# Patient Record
Sex: MALE | Race: WHITE | NOT HISPANIC OR LATINO
[De-identification: names, ages, dates, MRNs, and addresses within clinical notes are randomized per-mention and may not be internally consistent; named-entity substitution may affect disease eponyms.]

---

## 2017-02-17 ENCOUNTER — PRIOR ORIGINAL RECORDS (OUTPATIENT)
Dept: OTHER | Age: 57
End: 2017-02-17

## 2017-02-17 ENCOUNTER — APPOINTMENT (OUTPATIENT)
Dept: GENERAL RADIOLOGY | Facility: HOSPITAL | Age: 57
End: 2017-02-17
Payer: COMMERCIAL

## 2017-02-17 ENCOUNTER — APPOINTMENT (OUTPATIENT)
Dept: CT IMAGING | Facility: HOSPITAL | Age: 57
End: 2017-02-17
Attending: EMERGENCY MEDICINE
Payer: COMMERCIAL

## 2017-02-17 ENCOUNTER — HOSPITAL ENCOUNTER (EMERGENCY)
Facility: HOSPITAL | Age: 57
Discharge: HOME | End: 2017-02-17
Payer: COMMERCIAL

## 2017-02-17 VITALS
DIASTOLIC BLOOD PRESSURE: 70 MMHG | OXYGEN SATURATION: 97 % | TEMPERATURE: 97 F | SYSTOLIC BLOOD PRESSURE: 113 MMHG | WEIGHT: 222 LBS | RESPIRATION RATE: 16 BRPM | HEART RATE: 80 BPM | HEIGHT: 70 IN | BODY MASS INDEX: 31.78 KG/M2

## 2017-02-17 DIAGNOSIS — R05.9 COUGH: Primary | ICD-10-CM

## 2017-02-17 LAB
ALBUMIN SERPL-MCNC: 3.5 G/DL (ref 3.5–4.8)
ALP LIVER SERPL-CCNC: 89 U/L (ref 45–117)
ALT SERPL-CCNC: 44 U/L (ref 17–63)
AST SERPL-CCNC: 29 U/L (ref 15–41)
ATRIAL RATE: 81 BPM
BASOPHILS # BLD AUTO: 0.03 X10(3) UL (ref 0–0.1)
BASOPHILS NFR BLD AUTO: 0.5 %
BILIRUB SERPL-MCNC: 0.6 MG/DL (ref 0.1–2)
BUN BLD-MCNC: 16 MG/DL (ref 8–20)
CALCIUM BLD-MCNC: 8.8 MG/DL (ref 8.3–10.3)
CHLORIDE: 105 MMOL/L (ref 101–111)
CO2: 24 MMOL/L (ref 22–32)
CREAT BLD-MCNC: 1.12 MG/DL (ref 0.7–1.3)
EOSINOPHIL # BLD AUTO: 0.01 X10(3) UL (ref 0–0.3)
EOSINOPHIL NFR BLD AUTO: 0.2 %
ERYTHROCYTE [DISTWIDTH] IN BLOOD BY AUTOMATED COUNT: 13.1 % (ref 11.5–16)
GLUCOSE BLD-MCNC: 105 MG/DL (ref 70–99)
HCT VFR BLD AUTO: 45.8 % (ref 37–53)
HGB BLD-MCNC: 15.5 G/DL (ref 13–17)
IMMATURE GRANULOCYTE COUNT: 0.03 X10(3) UL (ref 0–1)
IMMATURE GRANULOCYTE RATIO %: 0.5 %
LYMPHOCYTES # BLD AUTO: 0.7 X10(3) UL (ref 0.9–4)
LYMPHOCYTES NFR BLD AUTO: 11.3 %
M PROTEIN MFR SERPL ELPH: 7.2 G/DL (ref 6.1–8.3)
MCH RBC QN AUTO: 29.4 PG (ref 27–33.2)
MCHC RBC AUTO-ENTMCNC: 33.8 G/DL (ref 31–37)
MCV RBC AUTO: 86.7 FL (ref 80–99)
MONOCYTES # BLD AUTO: 0.54 X10(3) UL (ref 0.1–0.6)
MONOCYTES NFR BLD AUTO: 8.8 %
NEUTROPHIL ABS PRELIM: 4.86 X10 (3) UL (ref 1.3–6.7)
NEUTROPHILS # BLD AUTO: 4.86 X10(3) UL (ref 1.3–6.7)
NEUTROPHILS NFR BLD AUTO: 78.7 %
P AXIS: 32 DEGREES
P-R INTERVAL: 136 MS
PLATELET # BLD AUTO: 143 10(3)UL (ref 150–450)
POTASSIUM SERPL-SCNC: 3.7 MMOL/L (ref 3.6–5.1)
Q-T INTERVAL: 394 MS
QRS DURATION: 100 MS
QTC CALCULATION (BEZET): 457 MS
R AXIS: -5 DEGREES
RBC # BLD AUTO: 5.28 X10(6)UL (ref 4.3–5.7)
RED CELL DISTRIBUTION WIDTH-SD: 41.2 FL (ref 35.1–46.3)
SODIUM SERPL-SCNC: 138 MMOL/L (ref 136–144)
T AXIS: 49 DEGREES
VENTRICULAR RATE: 81 BPM
WBC # BLD AUTO: 6.2 X10(3) UL (ref 4–13)

## 2017-02-17 PROCEDURE — 71275 CT ANGIOGRAPHY CHEST: CPT

## 2017-02-17 PROCEDURE — 36415 COLL VENOUS BLD VENIPUNCTURE: CPT

## 2017-02-17 PROCEDURE — 71020 XR CHEST PA + LAT CHEST (CPT=71020): CPT

## 2017-02-17 PROCEDURE — 80053 COMPREHEN METABOLIC PANEL: CPT | Performed by: EMERGENCY MEDICINE

## 2017-02-17 PROCEDURE — 93010 ELECTROCARDIOGRAM REPORT: CPT

## 2017-02-17 PROCEDURE — 93005 ELECTROCARDIOGRAM TRACING: CPT

## 2017-02-17 PROCEDURE — 99285 EMERGENCY DEPT VISIT HI MDM: CPT

## 2017-02-17 PROCEDURE — 85025 COMPLETE CBC W/AUTO DIFF WBC: CPT | Performed by: EMERGENCY MEDICINE

## 2017-02-17 RX ORDER — BENZONATATE 100 MG/1
100 CAPSULE ORAL 3 TIMES DAILY PRN
COMMUNITY
End: 2019-12-22 | Stop reason: CLARIF

## 2017-02-17 RX ORDER — PRAVASTATIN SODIUM 40 MG
40 TABLET ORAL DAILY
COMMUNITY

## 2017-02-17 RX ORDER — PREDNISONE 10 MG/1
10 TABLET ORAL DAILY
COMMUNITY
End: 2019-12-22 | Stop reason: CLARIF

## 2017-02-17 NOTE — ED PROVIDER NOTES
Patient Seen in: [facility] Emergency Department    History   Patient presents with:  Fever Sepsis (infectious)  Cough/URI    Stated Complaint: fever, JUAN PABLO    HPI    65-year-old white male who presents emerged from today for complaint of cough.   [place] PROMETHAZINE-DM OR,  Take by mouth.   predniSONE 10 MG Oral Tab,  Take 10 mg by mouth daily. UNKNOWN TO PATIENT,  cholesterol   Esomeprazole Magnesium (NEXIUM OR),  Take  by mouth. Fexofenadine HCl (ALLEGRA ALLERGY OR),  Take  by mouth daily.    PARoxet nontender to deep palpation there is no rebound or guarding noted no hepatosplenomegaly is noted. No masses are noted. No hernias are palpated. Extremity exam reveals no clubbing cyanosis or edema.   Patient has good radial pulses noted bilaterally in identified. There is no pneumothorax. Heart size and vascularity are normal.\    Impression:      CONCLUSION:  Mediastinal or right hilar mass is suspected.  CT for further evaluation is recommended.        CT scan of the chest revealed:    FINDINGS:    VAS Impression:  Cough  (primary encounter diagnosis)    Disposition:  Discharge    Follow-up:  Maverick Hand, [address] [address]  [phone]    In 2 days        Medications Prescribed:  Current Discharge Medication List

## 2017-03-06 ENCOUNTER — PRIOR ORIGINAL RECORDS (OUTPATIENT)
Dept: OTHER | Age: 57
End: 2017-03-06

## 2017-03-06 ENCOUNTER — MYAURORA ACCOUNT LINK (OUTPATIENT)
Dept: OTHER | Age: 57
End: 2017-03-06

## 2017-03-08 LAB
ALBUMIN: 3.5 G/DL
ALKALINE PHOSPHATATE(ALK PHOS): 89 IU/L
BILIRUBIN TOTAL: 0.6 MG/DL
BUN: 16 MG/DL
CALCIUM: 8.8 MG/DL
CHLORIDE: 105 MEQ/L
CREATININE, SERUM: 1.12 MG/DL
GLUCOSE: 105 MG/DL
HEMATOCRIT: 45.8 %
HEMOGLOBIN: 15.5 G/DL
PLATELETS: 143 K/UL
POTASSIUM, SERUM: 3.7 MEQ/L
PROTEIN, TOTAL: 7.2 G/DL
RED BLOOD COUNT: 5.28 X 10-6/U
SGOT (AST): 29 IU/L
SGPT (ALT): 44 IU/L
SODIUM: 138 MEQ/L
WHITE BLOOD COUNT: 6.2 X 10-3/U

## 2018-02-27 ENCOUNTER — HOSPITAL ENCOUNTER (OUTPATIENT)
Dept: CV DIAGNOSTICS | Facility: HOSPITAL | Age: 58
Discharge: HOME | End: 2018-02-27
Attending: INTERNAL MEDICINE

## 2018-02-27 ENCOUNTER — MYAURORA ACCOUNT LINK (OUTPATIENT)
Dept: OTHER | Age: 58
End: 2018-02-27

## 2018-02-27 DIAGNOSIS — Q23.1 BICUSPID AORTIC VALVE: ICD-10-CM

## 2018-02-27 DIAGNOSIS — I35.1 NONRHEUMATIC AORTIC VALVE INSUFFICIENCY: ICD-10-CM

## 2018-03-05 ENCOUNTER — MYAURORA ACCOUNT LINK (OUTPATIENT)
Dept: OTHER | Age: 58
End: 2018-03-05

## 2018-03-05 ENCOUNTER — PRIOR ORIGINAL RECORDS (OUTPATIENT)
Dept: OTHER | Age: 58
End: 2018-03-05

## 2018-03-17 ENCOUNTER — HOSPITAL ENCOUNTER (OUTPATIENT)
Dept: CV DIAGNOSTICS | Facility: HOSPITAL | Age: 58
Discharge: HOME | End: 2018-03-17
Attending: INTERNAL MEDICINE
Payer: COMMERCIAL

## 2018-03-17 DIAGNOSIS — I10 HTN (HYPERTENSION): ICD-10-CM

## 2018-03-17 DIAGNOSIS — I35.1 AORTIC VALVE INSUFFICIENCY, ACQUIRED: ICD-10-CM

## 2018-03-17 PROCEDURE — 93018 CV STRESS TEST I&R ONLY: CPT | Performed by: INTERNAL MEDICINE

## 2018-03-17 PROCEDURE — 93017 CV STRESS TEST TRACING ONLY: CPT | Performed by: INTERNAL MEDICINE

## 2018-03-17 PROCEDURE — 78452 HT MUSCLE IMAGE SPECT MULT: CPT | Performed by: INTERNAL MEDICINE

## 2018-09-09 ENCOUNTER — OFFICE VISIT (OUTPATIENT)
Dept: FAMILY MEDICINE CLINIC | Facility: CLINIC | Age: 58
End: 2018-09-09
Payer: COMMERCIAL

## 2018-09-09 VITALS
OXYGEN SATURATION: 98 % | DIASTOLIC BLOOD PRESSURE: 78 MMHG | RESPIRATION RATE: 18 BRPM | TEMPERATURE: 99 F | HEART RATE: 106 BPM | SYSTOLIC BLOOD PRESSURE: 144 MMHG

## 2018-09-09 DIAGNOSIS — J01.10 ACUTE NON-RECURRENT FRONTAL SINUSITIS: Primary | ICD-10-CM

## 2018-09-09 PROCEDURE — 99203 OFFICE O/P NEW LOW 30 MIN: CPT | Performed by: NURSE PRACTITIONER

## 2018-09-09 RX ORDER — AMOXICILLIN AND CLAVULANATE POTASSIUM 875; 125 MG/1; MG/1
1 TABLET, FILM COATED ORAL 2 TIMES DAILY
Qty: 20 TABLET | Refills: 0 | Status: SHIPPED | OUTPATIENT
Start: 2018-09-09 | End: 2018-09-19

## 2018-09-09 NOTE — PROGRESS NOTES
CHIEF COMPLAINT:   Patient presents with:  Sore Throat  Nasal Congestion      HPI:   Brionna Valladares is a 60year old male who presents for sinus congestion for  10  days. Symptoms have been worsening since onset.  Sinus congestion/pain is described as a essential hypertension       Past Surgical History:  2000: COLONOSCOPY  No date: SINUS SURGERY    11/2011: UPPER GI ENDOSCOPY - REFERRAL      Comment:  THE [facility] 11/16/11.  Nl esophagus, small hiatal hernia,                otherwise normal.   Family History murmur  EXTREMITIES: no cyanosis, clubbing or edema  LYMPH:  No lymphadenopathy. ASSESSMENT AND PLAN:   ASSESSMENT:  Ross Godinez is a 60year old male who presents with    ASSESSMENT: 1. Acute Sinusitis. PLAN: Meds as below.   Comfort care inst

## 2019-01-01 ENCOUNTER — HOSPITAL ENCOUNTER (EMERGENCY)
Facility: HOSPITAL | Age: 59
Discharge: HOME | End: 2019-01-01
Attending: EMERGENCY MEDICINE
Payer: COMMERCIAL

## 2019-01-01 ENCOUNTER — APPOINTMENT (OUTPATIENT)
Dept: GENERAL RADIOLOGY | Facility: HOSPITAL | Age: 59
End: 2019-01-01
Attending: EMERGENCY MEDICINE
Payer: COMMERCIAL

## 2019-01-01 VITALS
OXYGEN SATURATION: 97 % | DIASTOLIC BLOOD PRESSURE: 86 MMHG | WEIGHT: 240 LBS | TEMPERATURE: 98 F | SYSTOLIC BLOOD PRESSURE: 133 MMHG | RESPIRATION RATE: 25 BRPM | BODY MASS INDEX: 33.6 KG/M2 | HEIGHT: 71 IN | HEART RATE: 55 BPM

## 2019-01-01 DIAGNOSIS — R07.89 CHEST PAIN, ATYPICAL: Primary | ICD-10-CM

## 2019-01-01 LAB
ALBUMIN SERPL-MCNC: 3.5 G/DL (ref 3.1–4.5)
ALBUMIN/GLOB SERPL: 0.8 {RATIO} (ref 1–2)
ALP LIVER SERPL-CCNC: 92 U/L (ref 45–117)
ALT SERPL-CCNC: 61 U/L (ref 17–63)
ANION GAP SERPL CALC-SCNC: 5 MMOL/L (ref 0–18)
AST SERPL-CCNC: 52 U/L (ref 15–41)
BASOPHILS # BLD AUTO: 0.05 X10(3) UL (ref 0–0.1)
BASOPHILS NFR BLD AUTO: 0.9 %
BILIRUB SERPL-MCNC: 0.5 MG/DL (ref 0.1–2)
BUN BLD-MCNC: 11 MG/DL (ref 8–20)
BUN/CREAT SERPL: 14.7 (ref 10–20)
CALCIUM BLD-MCNC: 8.9 MG/DL (ref 8.3–10.3)
CHLORIDE SERPL-SCNC: 105 MMOL/L (ref 101–111)
CO2 SERPL-SCNC: 25 MMOL/L (ref 22–32)
CREAT BLD-MCNC: 0.75 MG/DL (ref 0.7–1.3)
D-DIMER: 0.4 UG/ML FEU (ref 0–0.49)
EOSINOPHIL # BLD AUTO: 0.17 X10(3) UL (ref 0–0.3)
EOSINOPHIL NFR BLD AUTO: 3 %
ERYTHROCYTE [DISTWIDTH] IN BLOOD BY AUTOMATED COUNT: 12.5 % (ref 11.5–16)
GLOBULIN PLAS-MCNC: 4.2 G/DL (ref 2.8–4.4)
GLUCOSE BLD-MCNC: 96 MG/DL (ref 70–99)
HCT VFR BLD AUTO: 47.2 % (ref 37–53)
HGB BLD-MCNC: 16.4 G/DL (ref 13–17)
IMMATURE GRANULOCYTE COUNT: 0.02 X10(3) UL (ref 0–1)
IMMATURE GRANULOCYTE RATIO %: 0.4 %
LYMPHOCYTES # BLD AUTO: 1.99 X10(3) UL (ref 0.9–4)
LYMPHOCYTES NFR BLD AUTO: 35 %
M PROTEIN MFR SERPL ELPH: 7.7 G/DL (ref 6.4–8.2)
MCH RBC QN AUTO: 30.4 PG (ref 27–33.2)
MCHC RBC AUTO-ENTMCNC: 34.7 G/DL (ref 31–37)
MCV RBC AUTO: 87.6 FL (ref 80–99)
MONOCYTES # BLD AUTO: 0.71 X10(3) UL (ref 0.1–1)
MONOCYTES NFR BLD AUTO: 12.5 %
NEUTROPHIL ABS PRELIM: 2.75 X10 (3) UL (ref 1.3–6.7)
NEUTROPHILS # BLD AUTO: 2.75 X10(3) UL (ref 1.3–6.7)
NEUTROPHILS NFR BLD AUTO: 48.2 %
OSMOLALITY SERPL CALC.SUM OF ELEC: 279 MOSM/KG (ref 275–295)
PLATELET # BLD AUTO: 131 10(3)UL (ref 150–450)
POTASSIUM SERPL-SCNC: 3.5 MMOL/L (ref 3.6–5.1)
RBC # BLD AUTO: 5.39 X10(6)UL (ref 4.3–5.7)
RED CELL DISTRIBUTION WIDTH-SD: 39.8 FL (ref 35.1–46.3)
SODIUM SERPL-SCNC: 135 MMOL/L (ref 136–144)
TROPONIN I SERPL-MCNC: <0.046 NG/ML (ref ?–0.05)
WBC # BLD AUTO: 5.7 X10(3) UL (ref 4–13)

## 2019-01-01 PROCEDURE — 84460 ALANINE AMINO (ALT) (SGPT): CPT | Performed by: EMERGENCY MEDICINE

## 2019-01-01 PROCEDURE — 84132 ASSAY OF SERUM POTASSIUM: CPT | Performed by: EMERGENCY MEDICINE

## 2019-01-01 PROCEDURE — 71045 X-RAY EXAM CHEST 1 VIEW: CPT | Performed by: EMERGENCY MEDICINE

## 2019-01-01 PROCEDURE — 93005 ELECTROCARDIOGRAM TRACING: CPT

## 2019-01-01 PROCEDURE — 80053 COMPREHEN METABOLIC PANEL: CPT | Performed by: EMERGENCY MEDICINE

## 2019-01-01 PROCEDURE — 99285 EMERGENCY DEPT VISIT HI MDM: CPT | Performed by: EMERGENCY MEDICINE

## 2019-01-01 PROCEDURE — 85378 FIBRIN DEGRADE SEMIQUANT: CPT | Performed by: EMERGENCY MEDICINE

## 2019-01-01 PROCEDURE — 84450 TRANSFERASE (AST) (SGOT): CPT | Performed by: EMERGENCY MEDICINE

## 2019-01-01 PROCEDURE — 84484 ASSAY OF TROPONIN QUANT: CPT | Performed by: EMERGENCY MEDICINE

## 2019-01-01 PROCEDURE — 85025 COMPLETE CBC W/AUTO DIFF WBC: CPT | Performed by: EMERGENCY MEDICINE

## 2019-01-01 PROCEDURE — 93010 ELECTROCARDIOGRAM REPORT: CPT | Performed by: EMERGENCY MEDICINE

## 2019-01-01 PROCEDURE — 36415 COLL VENOUS BLD VENIPUNCTURE: CPT | Performed by: EMERGENCY MEDICINE

## 2019-01-01 RX ORDER — ASPIRIN 81 MG/1
324 TABLET, CHEWABLE ORAL ONCE
Status: COMPLETED | OUTPATIENT
Start: 2019-01-01 | End: 2019-01-01

## 2019-01-01 RX ORDER — TRIAMTERENE AND HYDROCHLOROTHIAZIDE 37.5; 25 MG/1; MG/1
1 TABLET ORAL DAILY
COMMUNITY
End: 2021-06-04

## 2019-01-01 NOTE — ED INITIAL ASSESSMENT (HPI)
61 y/o male to ED with c/o of chest palpitations, lightheadedness and pain to left shoulder that radiates to left neck/jaw. Patient reports to feeling symptoms for the last \"few days\" believed it was muscle pain. Patient has hx of bicuspid valve.

## 2019-01-01 NOTE — ED PROVIDER NOTES
Patient Seen in: [facility] Emergency Department    History   Patient presents with:  Chest Pain Angina (cardiovascular)    Stated Complaint: chest pain    HPI    58-year-old male comes to the hospital complaint having difficulty pain by the of his Systems    Positive for stated complaint: chest pain  Other systems are as noted in HPI. Constitutional and vital signs reviewed. All other systems reviewed and negative except as noted above.     Physical Exam     ED Triage Vitals [01/01/19 1659]   B the patient. ALT (SGPT) - Normal   CBC WITH DIFFERENTIAL WITH PLATELET    Narrative: The following orders were created for panel order CBC WITH DIFFERENTIAL WITH PLATELET.   Procedure                               Abnormality         Status [place] [address]  [phone]    Schedule an appointment as soon as possible for a visit in 2 days          Medications Prescribed:  Current Discharge Medication List

## 2019-01-02 LAB
ATRIAL RATE: 59 BPM
P AXIS: 23 DEGREES
P-R INTERVAL: 158 MS
Q-T INTERVAL: 452 MS
QRS DURATION: 94 MS
QTC CALCULATION (BEZET): 447 MS
R AXIS: -8 DEGREES
T AXIS: 45 DEGREES
VENTRICULAR RATE: 59 BPM

## 2019-02-18 ENCOUNTER — HOSPITAL ENCOUNTER (OUTPATIENT)
Dept: CV DIAGNOSTICS | Facility: HOSPITAL | Age: 59
Discharge: HOME | End: 2019-02-18
Attending: INTERNAL MEDICINE

## 2019-02-18 DIAGNOSIS — I35.1 AORTIC VALVE INSUFFICIENCY, ETIOLOGY OF CARDIAC VALVE DISEASE UNSPECIFIED: ICD-10-CM

## 2019-02-28 VITALS
HEART RATE: 73 BPM | BODY MASS INDEX: 32.5 KG/M2 | WEIGHT: 227 LBS | DIASTOLIC BLOOD PRESSURE: 74 MMHG | HEIGHT: 70 IN | SYSTOLIC BLOOD PRESSURE: 124 MMHG

## 2019-03-01 ENCOUNTER — OFFICE VISIT (OUTPATIENT)
Dept: SURGERY | Facility: CLINIC | Age: 59
End: 2019-03-01
Payer: COMMERCIAL

## 2019-03-01 VITALS
HEART RATE: 72 BPM | BODY MASS INDEX: 33.93 KG/M2 | DIASTOLIC BLOOD PRESSURE: 73 MMHG | WEIGHT: 237 LBS | HEIGHT: 70 IN | RESPIRATION RATE: 18 BRPM | SYSTOLIC BLOOD PRESSURE: 118 MMHG

## 2019-03-01 VITALS
HEART RATE: 72 BPM | WEIGHT: 229 LBS | SYSTOLIC BLOOD PRESSURE: 110 MMHG | DIASTOLIC BLOOD PRESSURE: 76 MMHG | HEIGHT: 70 IN | BODY MASS INDEX: 32.78 KG/M2

## 2019-03-01 DIAGNOSIS — R59.9 ENLARGED LYMPH NODE: Primary | ICD-10-CM

## 2019-03-01 PROCEDURE — 99243 OFF/OP CNSLTJ NEW/EST LOW 30: CPT | Performed by: SURGERY

## 2019-03-01 NOTE — H&P
New Patient Visit Note       Active Problems      1. Enlarged lymph node        Chief Complaint   Patient presents with:  Hernia: NW PT ref by PCP for RT ING hernia. PT states that he has had hernia for about 5yrs, denies changes in size.  PT has PN (6/10) Father         AORTIC ANEURYSM   • Colon Cancer Father    • Cancer Paternal Grandfather         COLON CANCER   • Colon Cancer Paternal Grandfather    • Cancer Brother         colon dxd 38- history of IBD and had surgery where tumor found at time of operati for chills, diaphoresis, fatigue, fever and unexpected weight change. HENT: Negative for hearing loss, nosebleeds, sore throat and trouble swallowing. Respiratory: Negative for apnea, cough, shortness of breath and wheezing.     Cardiovascular: Negativ answered. No orders of the defined types were placed in this encounter. Imaging & Referrals   None    Follow Up  No Follow-up on file.     Ebony Blum MD

## 2019-03-13 RX ORDER — PRAVASTATIN SODIUM 40 MG
TABLET ORAL
COMMUNITY
Start: 2017-03-06

## 2019-03-13 RX ORDER — FAMOTIDINE 20 MG/1
TABLET, FILM COATED ORAL
COMMUNITY
Start: 2016-10-07 | End: 2019-04-23

## 2019-03-13 RX ORDER — TRIAMTERENE AND HYDROCHLOROTHIAZIDE 37.5; 25 MG/1; MG/1
1 CAPSULE ORAL DAILY
COMMUNITY
Start: 2017-03-06

## 2019-03-13 RX ORDER — PAROXETINE 10 MG/1
TABLET, FILM COATED ORAL DAILY
COMMUNITY
Start: 2014-11-11

## 2019-04-23 ENCOUNTER — OFFICE VISIT (OUTPATIENT)
Dept: CARDIOLOGY | Age: 59
End: 2019-04-23

## 2019-04-23 DIAGNOSIS — I77.810 DILATED AORTIC ROOT (CMD): ICD-10-CM

## 2019-04-23 DIAGNOSIS — Q23.1 BICUSPID AORTIC VALVE: Primary | ICD-10-CM

## 2019-04-23 PROCEDURE — 99214 OFFICE O/P EST MOD 30 MIN: CPT | Performed by: NURSE PRACTITIONER

## 2019-04-23 PROCEDURE — 3074F SYST BP LT 130 MM HG: CPT | Performed by: NURSE PRACTITIONER

## 2019-04-23 PROCEDURE — 3078F DIAST BP <80 MM HG: CPT | Performed by: NURSE PRACTITIONER

## 2019-04-23 RX ORDER — LOSARTAN POTASSIUM 100 MG/1
100 TABLET ORAL
COMMUNITY
End: 2019-04-23

## 2019-04-23 RX ORDER — ALPRAZOLAM 0.25 MG/1
0.25 TABLET ORAL
Refills: 0 | COMMUNITY
Start: 2019-02-28 | End: 2019-04-23

## 2019-04-23 ASSESSMENT — ENCOUNTER SYMPTOMS
RESPIRATORY NEGATIVE: 1
ENDOCRINE NEGATIVE: 1
NEUROLOGICAL NEGATIVE: 1
GASTROINTESTINAL NEGATIVE: 1
CONSTITUTIONAL NEGATIVE: 1
HEMATOLOGIC/LYMPHATIC NEGATIVE: 1
EYES NEGATIVE: 1

## 2019-04-23 ASSESSMENT — PATIENT HEALTH QUESTIONNAIRE - PHQ9
1. LITTLE INTEREST OR PLEASURE IN DOING THINGS: NOT AT ALL
SUM OF ALL RESPONSES TO PHQ9 QUESTIONS 1 AND 2: 0
2. FEELING DOWN, DEPRESSED OR HOPELESS: NOT AT ALL
SUM OF ALL RESPONSES TO PHQ9 QUESTIONS 1 AND 2: 0

## 2019-04-23 ASSESSMENT — PAIN SCALES - GENERAL: PAINLEVEL: 0

## 2019-12-22 ENCOUNTER — APPOINTMENT (OUTPATIENT)
Dept: CT IMAGING | Facility: HOSPITAL | Age: 59
End: 2019-12-22
Attending: EMERGENCY MEDICINE
Payer: COMMERCIAL

## 2019-12-22 ENCOUNTER — HOSPITAL ENCOUNTER (OUTPATIENT)
Facility: HOSPITAL | Age: 59
Setting detail: OBSERVATION
Discharge: HOME | End: 2019-12-23
Attending: EMERGENCY MEDICINE | Admitting: INTERNAL MEDICINE
Payer: COMMERCIAL

## 2019-12-22 ENCOUNTER — APPOINTMENT (OUTPATIENT)
Dept: GENERAL RADIOLOGY | Facility: HOSPITAL | Age: 59
End: 2019-12-22
Attending: EMERGENCY MEDICINE
Payer: COMMERCIAL

## 2019-12-22 DIAGNOSIS — I10 HYPERTENSION, UNSPECIFIED TYPE: ICD-10-CM

## 2019-12-22 DIAGNOSIS — D69.6 THROMBOCYTOPENIA (HCC): ICD-10-CM

## 2019-12-22 DIAGNOSIS — Q23.1 BICUSPID AORTIC VALVE: ICD-10-CM

## 2019-12-22 DIAGNOSIS — H81.10 BENIGN PAROXYSMAL POSITIONAL VERTIGO, UNSPECIFIED LATERALITY: ICD-10-CM

## 2019-12-22 DIAGNOSIS — R07.9 ACUTE CHEST PAIN: Primary | ICD-10-CM

## 2019-12-22 PROCEDURE — 70450 CT HEAD/BRAIN W/O DYE: CPT | Performed by: EMERGENCY MEDICINE

## 2019-12-22 PROCEDURE — 71045 X-RAY EXAM CHEST 1 VIEW: CPT | Performed by: EMERGENCY MEDICINE

## 2019-12-22 PROCEDURE — 99215 OFFICE O/P EST HI 40 MIN: CPT | Performed by: INTERNAL MEDICINE

## 2019-12-22 PROCEDURE — 99220 INITIAL OBSERVATION CARE,LEVL III: CPT | Performed by: INTERNAL MEDICINE

## 2019-12-22 RX ORDER — PAROXETINE HYDROCHLORIDE 20 MG/1
20 TABLET, FILM COATED ORAL EVERY MORNING
Status: DISCONTINUED | OUTPATIENT
Start: 2019-12-23 | End: 2019-12-23

## 2019-12-22 RX ORDER — CETIRIZINE HYDROCHLORIDE 10 MG/1
10 TABLET ORAL DAILY
Status: DISCONTINUED | OUTPATIENT
Start: 2019-12-23 | End: 2019-12-23

## 2019-12-22 RX ORDER — HEPARIN SODIUM 5000 [USP'U]/ML
5000 INJECTION, SOLUTION INTRAVENOUS; SUBCUTANEOUS EVERY 12 HOURS SCHEDULED
Status: DISCONTINUED | OUTPATIENT
Start: 2019-12-22 | End: 2019-12-23

## 2019-12-22 RX ORDER — MECLIZINE HYDROCHLORIDE 25 MG/1
25 TABLET ORAL ONCE
Status: COMPLETED | OUTPATIENT
Start: 2019-12-22 | End: 2019-12-22

## 2019-12-22 RX ORDER — ASPIRIN 81 MG/1
324 TABLET, CHEWABLE ORAL ONCE
Status: COMPLETED | OUTPATIENT
Start: 2019-12-22 | End: 2019-12-22

## 2019-12-22 RX ORDER — TRIAMTERENE AND HYDROCHLOROTHIAZIDE 75; 50 MG/1; MG/1
0.5 TABLET ORAL DAILY
Status: DISCONTINUED | OUTPATIENT
Start: 2019-12-23 | End: 2019-12-23

## 2019-12-22 RX ORDER — MAGNESIUM OXIDE 400 MG (241.3 MG MAGNESIUM) TABLET
3 TABLET NIGHTLY
Status: DISCONTINUED | OUTPATIENT
Start: 2019-12-22 | End: 2019-12-23

## 2019-12-22 RX ORDER — NITROGLYCERIN 0.4 MG/1
0.4 TABLET SUBLINGUAL EVERY 5 MIN PRN
Status: DISCONTINUED | OUTPATIENT
Start: 2019-12-22 | End: 2019-12-23

## 2019-12-22 RX ORDER — MECLIZINE HCL 12.5 MG/1
12.5 TABLET ORAL EVERY 6 HOURS SCHEDULED
Status: DISCONTINUED | OUTPATIENT
Start: 2019-12-23 | End: 2019-12-23

## 2019-12-22 RX ORDER — FEXOFENADINE HCL 180 MG/1
180 TABLET ORAL DAILY
COMMUNITY

## 2019-12-22 RX ORDER — POTASSIUM CHLORIDE 20 MEQ/1
40 TABLET, EXTENDED RELEASE ORAL EVERY 4 HOURS
Status: COMPLETED | OUTPATIENT
Start: 2019-12-22 | End: 2019-12-23

## 2019-12-22 RX ORDER — ASPIRIN 325 MG
325 TABLET ORAL DAILY
Status: DISCONTINUED | OUTPATIENT
Start: 2019-12-22 | End: 2019-12-23

## 2019-12-22 RX ORDER — FAMOTIDINE 20 MG/1
20 TABLET ORAL 2 TIMES DAILY
Status: DISCONTINUED | OUTPATIENT
Start: 2019-12-22 | End: 2019-12-23

## 2019-12-22 RX ORDER — PRAVASTATIN SODIUM 20 MG
20 TABLET ORAL NIGHTLY
Status: DISCONTINUED | OUTPATIENT
Start: 2019-12-22 | End: 2019-12-23

## 2019-12-22 NOTE — ED INITIAL ASSESSMENT (HPI)
Pt has a hx of vertigo, reports dizziness with sob x1 wk. Pt was sent to ER from pcp for sob and dizziness. Pt saw pcp today due to bilateral ear pain, more pain to the right ear, and is unable to wear hearing aids due to pain.   Pt states vertigo is more p

## 2019-12-22 NOTE — HISTORICAL OFFICE NOTE
[facility]    PCP: Ryna Fernando MD    Chief Complaint   Patient presents with   • Follow-up   annual       HPI  Olivia Diaz is a 60year old White male here today for annual follow-up.     History of bicuspid aortic valve, dilated aor Negative. Musculoskeletal: Negative. Gastrointestinal: Negative. Genitourinary: Negative. Neurological: Negative. All other systems reviewed and are negative.     Physical Exam  Visit Vitals  /66 (BP Location: [facility], Patient Position: Sitting,

## 2019-12-22 NOTE — CONSULTS
[facility]  Report of Consultation    Gregory Blackburn Patient Status:  Emergency    1960 MRN [id number]   Location [facility] [facility] Attending Nicole Mcfarland MD   Hosp Day # 0 PCP Awilda Adamson MD     [facility], [facility] MAIN OR   • SINUS SURGERY       • UPPER GI ENDOSCOPY - REFERRAL  11/2011    [facility] 11/16/11.  Nl esophagus, small hiatal hernia, otherwise normal.     Family History   Problem Relation Age of Onset   • Cancer Father         COLON CANCER   • Heart Disorder F hyperlipidemia     Family history of colon cancer     Constipation     Bicuspid aortic valve     AAA family hx     Hiatal hernia     Furuncle of face     Swelling of eyelid     Pancreatitis, acute     Choledocholithiasis     Cholecystitis with cholelithias

## 2019-12-22 NOTE — ED PROVIDER NOTES
Patient Seen in: [facility] Emergency Department      History   Patient presents with:  Abnormal EKG  Dizziness    Stated Complaint: from immediate care, dizziness and abnormal ekg     HPI    60-year-old male presents to the emergency department fro SURGERY       • UPPER GI ENDOSCOPY - REFERRAL  11/2011    [facility] 11/16/11.  Nl esophagus, small hiatal hernia, otherwise normal.                    Social History    Tobacco Use      Smoking status: Never Smoker      Smokeless tobacco: Never Used    Alcohol following components:    .0 (*)     All other components within normal limits   TROPONIN I - Normal   TSH W REFLEX TO FREE T4 - Normal   SED RATE, WESTERGREN (AUTOMATED) - Normal   CBC WITH DIFFERENTIAL WITH PLATELET    Narrative:      The following laterality  Hypertension, unspecified type  Bicuspid aortic valve  Thrombocytopenia ([facility] [address]    Disposition:  Admit  12/22/2019  5:48 pm    Follow-up:  No follow-up provider specified.       Medications Prescribed:  Current Discharge Medication List

## 2019-12-23 ENCOUNTER — APPOINTMENT (OUTPATIENT)
Dept: CV DIAGNOSTICS | Facility: HOSPITAL | Age: 59
End: 2019-12-23
Attending: INTERNAL MEDICINE
Payer: COMMERCIAL

## 2019-12-23 ENCOUNTER — APPOINTMENT (OUTPATIENT)
Dept: ULTRASOUND IMAGING | Facility: HOSPITAL | Age: 59
End: 2019-12-23
Attending: INTERNAL MEDICINE
Payer: COMMERCIAL

## 2019-12-23 VITALS
OXYGEN SATURATION: 95 % | BODY MASS INDEX: 35.15 KG/M2 | HEART RATE: 61 BPM | HEIGHT: 70 IN | SYSTOLIC BLOOD PRESSURE: 143 MMHG | DIASTOLIC BLOOD PRESSURE: 89 MMHG | TEMPERATURE: 98 F | WEIGHT: 245.56 LBS | RESPIRATION RATE: 15 BRPM

## 2019-12-23 PROCEDURE — 99214 OFFICE O/P EST MOD 30 MIN: CPT | Performed by: INTERNAL MEDICINE

## 2019-12-23 PROCEDURE — 93017 CV STRESS TEST TRACING ONLY: CPT | Performed by: INTERNAL MEDICINE

## 2019-12-23 PROCEDURE — 93306 TTE W/DOPPLER COMPLETE: CPT | Performed by: INTERNAL MEDICINE

## 2019-12-23 PROCEDURE — 93880 EXTRACRANIAL BILAT STUDY: CPT | Performed by: INTERNAL MEDICINE

## 2019-12-23 PROCEDURE — 99217 OBSERVATION CARE DISCHARGE: CPT | Performed by: HOSPITALIST

## 2019-12-23 PROCEDURE — 78452 HT MUSCLE IMAGE SPECT MULT: CPT | Performed by: INTERNAL MEDICINE

## 2019-12-23 PROCEDURE — 93018 CV STRESS TEST I&R ONLY: CPT | Performed by: INTERNAL MEDICINE

## 2019-12-23 NOTE — PROGRESS NOTES
[facility]  Cardiology Progress Note    Marvin Asher Patient Status:  Observation    1960 MRN [id number]   [facility] 8NE-A Attending Sugey Esparza MD   Hosp Day # 0 PCP Janine Rodriguez MD     Subjective:  Denies CP or SOB, just 0.5 tablet Oral Daily   • cetirizine  10 mg Oral Daily   • aspirin  325 mg Oral Daily   • Heparin Sodium (Porcine)  5,000 Units Subcutaneous 2 times per day   • melatonin  3 mg Oral Nightly   • famoTIDine  20 mg Oral BID   • Meclizine HCl  12.5 mg Oral 4 t

## 2019-12-23 NOTE — PROGRESS NOTES
Patient discharged to home. Being driven home by spouse. Reviewed all medications with patient. Phone numbers provided for follow up appointments to be made. Patient educated on signs and symptoms for which to call MD or return to ER.  States understanding

## 2019-12-23 NOTE — PROGRESS NOTES
Received pt from ER at [address]. Alert, denies chest pain at this time. VSS  Oriented to room, PCT getting pt settled, after Dr. Charissa Hairston. Instructed pt to call with SOB or chest pain.   Instructed pt stress test in am treadmill breakfast in morning and what t

## 2019-12-23 NOTE — PLAN OF CARE
Pt. A/O x 4 , monitor showing NSR , denies c/o any discomfort at present time . Lungs clear to auscultation , respirations easy and regular. Room air  saturation 98%. Scheduled for Stress Test , 2D Echo , US carotids today .  Plan of care discussed with t

## 2019-12-23 NOTE — PROGRESS NOTES
[facility] Cardiology Progress Note    Patient seen and examined.  Chart reviewed.     No chest pain or shortness of breath this AM.    /85 (BP Location: Left arm)   Pulse 56   Temp 97.8 °F (36.6 °C) (Oral)   Resp 18   Ht 5' 10\" (1.778 m)   Wt 245 lb 9.5 o motion abnormality   - will proceed with nuclear stress test this AM, if no ischemia ok to d/c home with outpatient follow-up with Dr. Jesus Henry  2.  Bicuspid aortic valve with mild AI and mild aortic root enlargement (4 cm on echo today)   - stable, cont

## 2019-12-23 NOTE — RESPIRATORY THERAPY NOTE
HOWARD - Equipment Use Daily Summary:                  . Set Mode: AUTO CPAP WITH C-FLEX                . Usage in hours: 2:56                . 90% Pressure (EPAP) level: 8.4                . 90% Insp. Pressure (IPAP): Janeth Larry  AHI: 1.4

## 2019-12-23 NOTE — PROGRESS NOTES
Lexiscan stress test    Lexiscan injection given  Pt denied cardiac symptoms  No arrhythmias noted  Pt tolerated well  Nuclear images pending  Katie Churchill, 12/23/19, 11:19 AM

## 2019-12-23 NOTE — PLAN OF CARE
Nuclear stress test EF 54% and negative for ischemia    Ok to be discharged from a cardiac standpoint    NAIN Lala

## 2019-12-23 NOTE — H&P
[facility]                                                               History & Physical         Destiny Bridge[place] Patient Status:  Observation    1960 MRN [id number]   [facility] 8NE-A Attending Marina Worrell MD   [facility] Day # • UPPER GI ENDOSCOPY - REFERRAL  11/2011    [facility] 11/16/11.  Nl esophagus, small hiatal hernia, otherwise normal.       Family history:  Family History   Problem Relation Age of Onset   • Cancer Father         COLON CANCER   • Heart Disorder Father tenderness  Neurologic: Awake alert oriented x3, no focal neurological deficits. Musculoskeletal: Full range of motion of all extremities. No swelling noted. Integument: No lesions. No erythema. Psychiatric: Appropriate mood and affect.       Diagnostic TECHNIQUE:  AP chest radiograph was obtained. COMPARISON:  [facility] , XR, XR CHEST AP PORTABLE  (CPT=71045), 1/01/2019, 17:22.      INDICATIONS:  from immediate care, dizziness and abnormal ekg     PATIENT STATED HISTORY: (As transcribed by Technologist

## 2019-12-23 NOTE — PLAN OF CARE
Assumed care of Pt. At [address] [place] [address]. Pt. Is Axox4 and on room air with O2 sat of 97%. Pt. Wears CPAP at night. . Pt. Has clear bilateral breath sounds. Pt. Has NSR/sinus patricia with a current HR of 51. K+ was 3.4, K+ replaced per protocol.  Pt. Currently denies c

## 2019-12-24 NOTE — DISCHARGE SUMMARY
[facility]  DISCHARGE SUMMARY     Tawny Bermudez Patient Status:  Observation    1960 MRN [id number]   [facility] 8NE-A Attending No att. providers found   Hosp Day # 0 PCP Fred Love MD     Date of Admission: 2019  Da symptoms for the last 1 week, some minimal right ear pain. Complains of heartburns. Denies any nausea vomiting. Denies any abdominal pain. Denies any diarrhea constipation. Denies any focal weakness or numbness. Denies any dysuria frequency.   No feve rhonchi. Cardiovascular: S1, S2. Regular rate and rhythm. No murmurs, rubs or gallops. Abdomen: Soft, nontender, nondistended. Positive bowel sounds. No rebound or guarding. Neurologic: No focal neurological deficits.    Musculoskeletal: Moves all extr

## 2020-04-27 ENCOUNTER — V-VISIT (OUTPATIENT)
Dept: CARDIOLOGY | Age: 60
End: 2020-04-27

## 2020-04-27 VITALS
BODY MASS INDEX: 33.64 KG/M2 | HEART RATE: 64 BPM | WEIGHT: 235 LBS | DIASTOLIC BLOOD PRESSURE: 66 MMHG | SYSTOLIC BLOOD PRESSURE: 125 MMHG | HEIGHT: 70 IN

## 2020-04-27 DIAGNOSIS — I35.1 NONRHEUMATIC AORTIC (VALVE) INSUFFICIENCY: ICD-10-CM

## 2020-04-27 DIAGNOSIS — R00.2 PALPITATIONS: ICD-10-CM

## 2020-04-27 DIAGNOSIS — Q23.1 BICUSPID AORTIC VALVE: ICD-10-CM

## 2020-04-27 DIAGNOSIS — I77.810 DILATED AORTIC ROOT (CMD): Primary | ICD-10-CM

## 2020-04-27 DIAGNOSIS — I10 ESSENTIAL HYPERTENSION: ICD-10-CM

## 2020-04-27 PROCEDURE — 3074F SYST BP LT 130 MM HG: CPT | Performed by: INTERNAL MEDICINE

## 2020-04-27 PROCEDURE — 99214 OFFICE O/P EST MOD 30 MIN: CPT | Performed by: INTERNAL MEDICINE

## 2020-04-27 PROCEDURE — 3078F DIAST BP <80 MM HG: CPT | Performed by: INTERNAL MEDICINE

## 2020-04-27 ASSESSMENT — PATIENT HEALTH QUESTIONNAIRE - PHQ9
1. LITTLE INTEREST OR PLEASURE IN DOING THINGS: NOT AT ALL
2. FEELING DOWN, DEPRESSED OR HOPELESS: NOT AT ALL
SUM OF ALL RESPONSES TO PHQ9 QUESTIONS 1 AND 2: 0
SUM OF ALL RESPONSES TO PHQ9 QUESTIONS 1 AND 2: 0

## 2020-04-29 ENCOUNTER — TELEPHONE (OUTPATIENT)
Dept: CARDIOLOGY | Age: 60
End: 2020-04-29

## 2020-04-30 ENCOUNTER — TELEPHONE (OUTPATIENT)
Dept: CARDIOLOGY | Age: 60
End: 2020-04-30

## 2020-05-20 ENCOUNTER — TELEPHONE (OUTPATIENT)
Dept: CARDIOLOGY | Age: 60
End: 2020-05-20

## 2020-11-06 ENCOUNTER — APPOINTMENT (OUTPATIENT)
Dept: LAB | Age: 60
End: 2020-11-06
Attending: FAMILY MEDICINE
Payer: COMMERCIAL

## 2020-11-06 DIAGNOSIS — Z20.828 EXPOSURE TO SARS-ASSOCIATED CORONAVIRUS: ICD-10-CM

## 2020-11-24 PROBLEM — R10.32 GROIN PAIN, LEFT: Status: ACTIVE | Noted: 2020-11-24

## 2021-03-03 ENCOUNTER — E-ADVICE (OUTPATIENT)
Dept: CARDIOLOGY | Age: 61
End: 2021-03-03

## 2021-04-19 ENCOUNTER — HOSPITAL ENCOUNTER (OUTPATIENT)
Dept: CV DIAGNOSTICS | Facility: HOSPITAL | Age: 61
Discharge: HOME | End: 2021-04-19
Attending: NURSE PRACTITIONER
Payer: COMMERCIAL

## 2021-04-19 DIAGNOSIS — I10 HYPERTENSION, ESSENTIAL: ICD-10-CM

## 2021-04-19 DIAGNOSIS — I35.1 NONRHEUMATIC AORTIC VALVE INSUFFICIENCY: ICD-10-CM

## 2021-04-19 DIAGNOSIS — I77.810 DILATED AORTIC ROOT (HCC): ICD-10-CM

## 2021-04-19 DIAGNOSIS — Q23.1 BICUSPID AORTIC VALVE: ICD-10-CM

## 2021-04-19 DIAGNOSIS — R00.2 PALPITATIONS: ICD-10-CM

## 2021-04-19 PROCEDURE — 93306 TTE W/DOPPLER COMPLETE: CPT | Performed by: INTERNAL MEDICINE

## 2021-04-26 ENCOUNTER — OFFICE VISIT (OUTPATIENT)
Dept: CARDIOLOGY | Age: 61
End: 2021-04-26

## 2021-04-26 DIAGNOSIS — R00.2 PALPITATIONS: ICD-10-CM

## 2021-04-26 DIAGNOSIS — I10 ESSENTIAL HYPERTENSION: Primary | ICD-10-CM

## 2021-04-26 DIAGNOSIS — Q23.1 BICUSPID AORTIC VALVE: ICD-10-CM

## 2021-04-26 DIAGNOSIS — I77.810 DILATED AORTIC ROOT (CMD): ICD-10-CM

## 2021-04-26 DIAGNOSIS — I35.1 NONRHEUMATIC AORTIC (VALVE) INSUFFICIENCY: ICD-10-CM

## 2021-04-26 PROCEDURE — 99215 OFFICE O/P EST HI 40 MIN: CPT | Performed by: INTERNAL MEDICINE

## 2021-04-26 ASSESSMENT — PATIENT HEALTH QUESTIONNAIRE - PHQ9
2. FEELING DOWN, DEPRESSED OR HOPELESS: NOT AT ALL
SUM OF ALL RESPONSES TO PHQ9 QUESTIONS 1 AND 2: 0
SUM OF ALL RESPONSES TO PHQ9 QUESTIONS 1 AND 2: 0
1. LITTLE INTEREST OR PLEASURE IN DOING THINGS: NOT AT ALL
CLINICAL INTERPRETATION OF PHQ9 SCORE: NO FURTHER SCREENING NEEDED
CLINICAL INTERPRETATION OF PHQ2 SCORE: NO FURTHER SCREENING NEEDED

## 2021-05-25 VITALS
DIASTOLIC BLOOD PRESSURE: 66 MMHG | HEART RATE: 66 BPM | BODY MASS INDEX: 34.5 KG/M2 | WEIGHT: 241 LBS | HEIGHT: 70 IN | SYSTOLIC BLOOD PRESSURE: 126 MMHG

## 2021-09-17 ENCOUNTER — OFFICE VISIT (OUTPATIENT)
Dept: FAMILY MEDICINE CLINIC | Facility: CLINIC | Age: 61
End: 2021-09-17
Payer: COMMERCIAL

## 2021-09-17 VITALS
TEMPERATURE: 97 F | SYSTOLIC BLOOD PRESSURE: 138 MMHG | RESPIRATION RATE: 16 BRPM | OXYGEN SATURATION: 98 % | DIASTOLIC BLOOD PRESSURE: 82 MMHG | BODY MASS INDEX: 33.36 KG/M2 | HEART RATE: 70 BPM | WEIGHT: 233 LBS | HEIGHT: 70 IN

## 2021-09-17 DIAGNOSIS — Z20.822 ENCOUNTER FOR LABORATORY TESTING FOR COVID-19 VIRUS: ICD-10-CM

## 2021-09-17 DIAGNOSIS — J02.9 SORE THROAT: Primary | ICD-10-CM

## 2021-09-17 LAB
CONTROL LINE PRESENT WITH A CLEAR BACKGROUND (YES/NO): YES YES/NO
KIT EXPIRATION DATE: NORMAL DATE
KIT LOT #: NORMAL NUMERIC
STREP GRP A CUL-SCR: NEGATIVE

## 2021-09-17 PROCEDURE — 87081 CULTURE SCREEN ONLY: CPT | Performed by: FAMILY MEDICINE

## 2021-09-17 PROCEDURE — 3008F BODY MASS INDEX DOCD: CPT | Performed by: FAMILY MEDICINE

## 2021-09-17 PROCEDURE — 87880 STREP A ASSAY W/OPTIC: CPT | Performed by: FAMILY MEDICINE

## 2021-09-17 PROCEDURE — 3075F SYST BP GE 130 - 139MM HG: CPT | Performed by: FAMILY MEDICINE

## 2021-09-17 PROCEDURE — 99213 OFFICE O/P EST LOW 20 MIN: CPT | Performed by: FAMILY MEDICINE

## 2021-09-17 PROCEDURE — 3079F DIAST BP 80-89 MM HG: CPT | Performed by: FAMILY MEDICINE

## 2021-09-17 RX ORDER — AMOXICILLIN 875 MG/1
875 TABLET, COATED ORAL 2 TIMES DAILY
Qty: 20 TABLET | Refills: 0 | Status: SHIPPED | OUTPATIENT
Start: 2021-09-17 | End: 2021-09-27

## 2021-09-17 NOTE — PROGRESS NOTES
CHIEF COMPLAINT:   Patient presents with:  Sore Throat: headache x2days        HPI:   Gregory Blackburn is a 57year old male presents to clinic with complaint of mild sore throat, headache and fatigue for 2 days. No known fever.  Patient denies cough, heada 33.43 kg/m²   GENERAL: well developed, well nourished,in no apparent distress  SKIN: no rashes,no suspicious lesions  HEAD: atraumatic, normocephalic  EYES: conjunctivae clear, EOM intact  EARS: TM's clear, non-injected, no bulging, retraction, or fluid bi high-risk exposure at home to strep, pt's systemic symptoms and his physical findings will treat empirically for strep. Will discontinue abx if culture is negative or if treatment is not effective.      Comfort measures explained and discussed as listed in

## 2021-09-17 NOTE — PATIENT INSTRUCTIONS
Your COVID test results will be back in approximately 48 hours. Your strep culture results will return in approximately 72 hours. Take antibiotics with food and plenty of water. Eat yogurt or take probiotic daily.  (Truong Rose is a good example of an OTC

## 2021-09-19 LAB — SARS-COV-2 RNA RESP QL NAA+PROBE: NOT DETECTED

## 2021-12-14 ENCOUNTER — OFFICE VISIT (OUTPATIENT)
Dept: FAMILY MEDICINE CLINIC | Facility: CLINIC | Age: 61
End: 2021-12-14
Payer: COMMERCIAL

## 2021-12-14 DIAGNOSIS — H60.392 OTHER INFECTIVE ACUTE OTITIS EXTERNA OF LEFT EAR: Primary | ICD-10-CM

## 2021-12-14 PROCEDURE — 99213 OFFICE O/P EST LOW 20 MIN: CPT | Performed by: NURSE PRACTITIONER

## 2021-12-14 PROCEDURE — 3079F DIAST BP 80-89 MM HG: CPT | Performed by: NURSE PRACTITIONER

## 2021-12-14 PROCEDURE — 3008F BODY MASS INDEX DOCD: CPT | Performed by: NURSE PRACTITIONER

## 2021-12-14 PROCEDURE — 3074F SYST BP LT 130 MM HG: CPT | Performed by: NURSE PRACTITIONER

## 2021-12-14 NOTE — PROGRESS NOTES
CHIEF COMPLAINT:   Patient presents with:  Ear Pain    HPI:   Kwaku Martínez is a 58 yo male who presents to clinic today with complaints of Left ear pain. Tenderness and discomfort has been worsening over the past 4 days.  Pt reports worsening of pain when h required several flushes. Pt tolerated this very well and hearing immediately restored. NOSE: nostrils patent, nasal mucosa pink and noninflamed  THROAT: oral mucosa pink, moist. Posterior pharynx is not erythematous or injected.    NECK: supple  LUNGS:

## 2021-12-27 VITALS
HEART RATE: 71 BPM | RESPIRATION RATE: 18 BRPM | DIASTOLIC BLOOD PRESSURE: 82 MMHG | WEIGHT: 240 LBS | OXYGEN SATURATION: 96 % | BODY MASS INDEX: 34.36 KG/M2 | SYSTOLIC BLOOD PRESSURE: 128 MMHG | TEMPERATURE: 98 F | HEIGHT: 70 IN

## 2022-01-25 ENCOUNTER — OFFICE VISIT (OUTPATIENT)
Dept: SURGERY | Facility: CLINIC | Age: 62
End: 2022-01-25
Payer: COMMERCIAL

## 2022-01-25 ENCOUNTER — TELEPHONE (OUTPATIENT)
Dept: SURGERY | Facility: CLINIC | Age: 62
End: 2022-01-25

## 2022-01-25 VITALS
WEIGHT: 240 LBS | DIASTOLIC BLOOD PRESSURE: 90 MMHG | HEIGHT: 70 IN | BODY MASS INDEX: 34.36 KG/M2 | SYSTOLIC BLOOD PRESSURE: 130 MMHG

## 2022-01-25 DIAGNOSIS — M47.812 CERVICAL SPONDYLOSIS: ICD-10-CM

## 2022-01-25 DIAGNOSIS — S46.012S TRAUMATIC TEAR OF LEFT ROTATOR CUFF, UNSPECIFIED TEAR EXTENT, SEQUELA: ICD-10-CM

## 2022-01-25 DIAGNOSIS — M54.2 CERVICAL PAIN: ICD-10-CM

## 2022-01-25 DIAGNOSIS — M48.02 CERVICAL STENOSIS OF SPINAL CANAL: Primary | ICD-10-CM

## 2022-01-25 PROCEDURE — 3080F DIAST BP >= 90 MM HG: CPT | Performed by: PHYSICIAN ASSISTANT

## 2022-01-25 PROCEDURE — 3075F SYST BP GE 130 - 139MM HG: CPT | Performed by: PHYSICIAN ASSISTANT

## 2022-01-25 PROCEDURE — 3008F BODY MASS INDEX DOCD: CPT | Performed by: PHYSICIAN ASSISTANT

## 2022-01-25 PROCEDURE — 99213 OFFICE O/P EST LOW 20 MIN: CPT | Performed by: PHYSICIAN ASSISTANT

## 2022-01-25 NOTE — PATIENT INSTRUCTIONS
Refill policies:    • Allow 2-3 business days for refills; controlled substances may take longer.   • Contact your pharmacy at least 5 days prior to running out of medication and have them send an electronic request or submit request through the [facility] Depending on your insurance carrier, approval may take 3-10 days. It is highly recommended patients contact their insurance carrier directly to determine coverage.   If test is done without insurance authorization, patient may be responsible for the entire comfort. He can proceed with having his left rotator cuff repaired with Dr. Dax Bower. Do recommend they do a fiberoptic intubation or glide scope. Limit cervical extension or hyperflexion maintain in neutral cervical spine on intubation.   He can follow-up

## 2022-01-25 NOTE — PROGRESS NOTES
Having stiffness in the left neck  Does have full thickness tear in Rotator cuff and sched for sx in Feb 10    Need to make sure that the neck is not an issue for surgery  No pain turning neck    Had bad neck pain last year and went to chiro and went a 3-4

## 2022-01-25 NOTE — PROGRESS NOTES
[facility] Neurosurgery Consultation      HISTORY OF PRESENT Nathalia Gilbert is a 57year old right-handed male gives a history of changing a light bulb requiring a ladder in August 2021.   He missed the last step grabbed with his left arm injuring his le LAPAROSCOPIC CHOLECYSTECTOMY;  Surgeon: Miriam Gonzalez MD;  Location: [address] [place] [address] OR   • SINUS SURGERY       • UPPER GI ENDOSCOPY - REFERRAL  11/2011    [facility] 11/16/11.  Nl esophagus, small hiatal hernia, otherwise normal.       FAMILY HISTORY:  family h 2+       2+              IMAGING:  MRI of the cervical spine from January 21, 2022  Shows loss of lordosis. Mild spondylosis at C3-4 moderate at C4-5 C5-6 and C6-7.   Has some foraminal stenosis at C3-4 with acquired central stenosis which is [employer]

## 2022-01-26 ENCOUNTER — TELEPHONE (OUTPATIENT)
Dept: SURGERY | Facility: CLINIC | Age: 62
End: 2022-01-26

## 2022-01-26 NOTE — TELEPHONE ENCOUNTER
Faxed most recent OV note from visit with Mustapha MENDOZA to  at [address] per Mustapha MENDOZA request. Confirmation received.

## 2022-03-10 ENCOUNTER — APPOINTMENT (OUTPATIENT)
Dept: GENERAL RADIOLOGY | Facility: HOSPITAL | Age: 62
End: 2022-03-10
Attending: EMERGENCY MEDICINE
Payer: COMMERCIAL

## 2022-03-10 ENCOUNTER — HOSPITAL ENCOUNTER (EMERGENCY)
Facility: HOSPITAL | Age: 62
Discharge: HOME | End: 2022-03-10
Attending: EMERGENCY MEDICINE
Payer: COMMERCIAL

## 2022-03-10 VITALS
HEIGHT: 70 IN | DIASTOLIC BLOOD PRESSURE: 89 MMHG | WEIGHT: 240 LBS | BODY MASS INDEX: 34.36 KG/M2 | SYSTOLIC BLOOD PRESSURE: 157 MMHG | RESPIRATION RATE: 18 BRPM | TEMPERATURE: 99 F | HEART RATE: 78 BPM | OXYGEN SATURATION: 98 %

## 2022-03-10 DIAGNOSIS — M54.16 LUMBAR RADICULOPATHY: Primary | ICD-10-CM

## 2022-03-10 PROCEDURE — 96376 TX/PRO/DX INJ SAME DRUG ADON: CPT

## 2022-03-10 PROCEDURE — 96375 TX/PRO/DX INJ NEW DRUG ADDON: CPT

## 2022-03-10 PROCEDURE — 99284 EMERGENCY DEPT VISIT MOD MDM: CPT

## 2022-03-10 PROCEDURE — 99285 EMERGENCY DEPT VISIT HI MDM: CPT

## 2022-03-10 PROCEDURE — 72110 X-RAY EXAM L-2 SPINE 4/>VWS: CPT | Performed by: EMERGENCY MEDICINE

## 2022-03-10 PROCEDURE — 96374 THER/PROPH/DIAG INJ IV PUSH: CPT

## 2022-03-10 RX ORDER — HYDROCODONE BITARTRATE AND ACETAMINOPHEN 5; 325 MG/1; MG/1
1 TABLET ORAL EVERY 6 HOURS PRN
Qty: 10 TABLET | Refills: 0 | Status: SHIPPED | OUTPATIENT
Start: 2022-03-10 | End: 2022-03-10

## 2022-03-10 RX ORDER — METHYLPREDNISOLONE SODIUM SUCCINATE 125 MG/2ML
125 INJECTION, POWDER, LYOPHILIZED, FOR SOLUTION INTRAMUSCULAR; INTRAVENOUS ONCE
Status: COMPLETED | OUTPATIENT
Start: 2022-03-10 | End: 2022-03-10

## 2022-03-10 RX ORDER — HYDROCODONE BITARTRATE AND ACETAMINOPHEN 5; 325 MG/1; MG/1
1 TABLET ORAL EVERY 6 HOURS PRN
Qty: 10 TABLET | Refills: 0 | Status: SHIPPED | OUTPATIENT
Start: 2022-03-10 | End: 2022-03-17

## 2022-03-10 RX ORDER — METHYLPREDNISOLONE 4 MG/1
TABLET ORAL
Qty: 1 EACH | Refills: 0 | Status: SHIPPED | OUTPATIENT
Start: 2022-03-10 | End: 2022-03-10

## 2022-03-10 RX ORDER — HYDROMORPHONE HYDROCHLORIDE 1 MG/ML
1 INJECTION, SOLUTION INTRAMUSCULAR; INTRAVENOUS; SUBCUTANEOUS ONCE
Status: COMPLETED | OUTPATIENT
Start: 2022-03-10 | End: 2022-03-10

## 2022-03-10 RX ORDER — METHYLPREDNISOLONE 4 MG/1
TABLET ORAL
Qty: 1 EACH | Refills: 0 | Status: SHIPPED | OUTPATIENT
Start: 2022-03-10

## 2022-03-11 NOTE — ED INITIAL ASSESSMENT (HPI)
Pt arrives with complaints of back pain since bending over in the shower today. Pt states that his lower back is in \"excruciating pain\".  Pt had left rotator cuff surgery on the 10th of Feb.

## 2022-03-20 ENCOUNTER — APPOINTMENT (OUTPATIENT)
Dept: GENERAL RADIOLOGY | Facility: HOSPITAL | Age: 62
End: 2022-03-20
Attending: EMERGENCY MEDICINE
Payer: COMMERCIAL

## 2022-03-20 ENCOUNTER — HOSPITAL ENCOUNTER (EMERGENCY)
Facility: HOSPITAL | Age: 62
Discharge: HOME | End: 2022-03-20
Attending: EMERGENCY MEDICINE
Payer: COMMERCIAL

## 2022-03-20 ENCOUNTER — APPOINTMENT (OUTPATIENT)
Dept: CT IMAGING | Facility: HOSPITAL | Age: 62
End: 2022-03-20
Attending: EMERGENCY MEDICINE
Payer: COMMERCIAL

## 2022-03-20 VITALS
HEIGHT: 70 IN | WEIGHT: 236 LBS | OXYGEN SATURATION: 100 % | TEMPERATURE: 98 F | HEART RATE: 97 BPM | SYSTOLIC BLOOD PRESSURE: 145 MMHG | BODY MASS INDEX: 33.79 KG/M2 | DIASTOLIC BLOOD PRESSURE: 91 MMHG | RESPIRATION RATE: 31 BRPM

## 2022-03-20 DIAGNOSIS — R07.81 PLEURITIC CHEST PAIN: Primary | ICD-10-CM

## 2022-03-20 LAB
ALBUMIN SERPL-MCNC: 3.7 G/DL (ref 3.4–5)
ALBUMIN/GLOB SERPL: 0.9 {RATIO} (ref 1–2)
ALP LIVER SERPL-CCNC: 92 U/L
ALT SERPL-CCNC: 36 U/L
ANION GAP SERPL CALC-SCNC: 5 MMOL/L (ref 0–18)
AST SERPL-CCNC: 22 U/L (ref 15–37)
ATRIAL RATE: 72 BPM
BASOPHILS # BLD AUTO: 0.04 X10(3) UL (ref 0–0.2)
BASOPHILS NFR BLD AUTO: 0.5 %
BILIRUB SERPL-MCNC: 1 MG/DL (ref 0.1–2)
BUN BLD-MCNC: 12 MG/DL (ref 7–18)
CALCIUM BLD-MCNC: 9.3 MG/DL (ref 8.5–10.1)
CHLORIDE SERPL-SCNC: 105 MMOL/L (ref 98–112)
CO2 SERPL-SCNC: 30 MMOL/L (ref 21–32)
CREAT BLD-MCNC: 0.88 MG/DL
D DIMER PPP FEU-MCNC: 0.63 UG/ML FEU (ref ?–0.62)
EOSINOPHIL # BLD AUTO: 0.16 X10(3) UL (ref 0–0.7)
EOSINOPHIL NFR BLD AUTO: 2 %
ERYTHROCYTE [DISTWIDTH] IN BLOOD BY AUTOMATED COUNT: 12.7 %
GLOBULIN PLAS-MCNC: 4 G/DL (ref 2.8–4.4)
GLUCOSE BLD-MCNC: 94 MG/DL (ref 70–99)
HCT VFR BLD AUTO: 50.1 %
HGB BLD-MCNC: 16.6 G/DL
IMM GRANULOCYTES # BLD AUTO: 0.03 X10(3) UL (ref 0–1)
IMM GRANULOCYTES NFR BLD: 0.4 %
LYMPHOCYTES # BLD AUTO: 1.64 X10(3) UL (ref 1–4)
LYMPHOCYTES NFR BLD AUTO: 20.6 %
MCH RBC QN AUTO: 29.9 PG (ref 26–34)
MCHC RBC AUTO-ENTMCNC: 33.1 G/DL (ref 31–37)
MCV RBC AUTO: 90.1 FL
MONOCYTES # BLD AUTO: 0.79 X10(3) UL (ref 0.1–1)
MONOCYTES NFR BLD AUTO: 9.9 %
NEUTROPHILS # BLD AUTO: 5.3 X10 (3) UL (ref 1.5–7.7)
NEUTROPHILS # BLD AUTO: 5.3 X10(3) UL (ref 1.5–7.7)
NEUTROPHILS NFR BLD AUTO: 66.6 %
OSMOLALITY SERPL CALC.SUM OF ELEC: 290 MOSM/KG (ref 275–295)
P AXIS: 39 DEGREES
P-R INTERVAL: 154 MS
PLATELET # BLD AUTO: 138 10(3)UL (ref 150–450)
POTASSIUM SERPL-SCNC: 3.2 MMOL/L (ref 3.5–5.1)
PROT SERPL-MCNC: 7.7 G/DL (ref 6.4–8.2)
Q-T INTERVAL: 440 MS
QRS DURATION: 96 MS
QTC CALCULATION (BEZET): 481 MS
R AXIS: 5 DEGREES
RBC # BLD AUTO: 5.56 X10(6)UL
SODIUM SERPL-SCNC: 140 MMOL/L (ref 136–145)
T AXIS: 90 DEGREES
TROPONIN I HIGH SENSITIVITY: 8 NG/L
TROPONIN I HIGH SENSITIVITY: 9 NG/L
VENTRICULAR RATE: 72 BPM
WBC # BLD AUTO: 8 X10(3) UL (ref 4–11)

## 2022-03-20 PROCEDURE — 84484 ASSAY OF TROPONIN QUANT: CPT | Performed by: EMERGENCY MEDICINE

## 2022-03-20 PROCEDURE — 93005 ELECTROCARDIOGRAM TRACING: CPT

## 2022-03-20 PROCEDURE — 99284 EMERGENCY DEPT VISIT MOD MDM: CPT

## 2022-03-20 PROCEDURE — 85025 COMPLETE CBC W/AUTO DIFF WBC: CPT | Performed by: EMERGENCY MEDICINE

## 2022-03-20 PROCEDURE — 85379 FIBRIN DEGRADATION QUANT: CPT | Performed by: EMERGENCY MEDICINE

## 2022-03-20 PROCEDURE — 93010 ELECTROCARDIOGRAM REPORT: CPT

## 2022-03-20 PROCEDURE — 80053 COMPREHEN METABOLIC PANEL: CPT | Performed by: EMERGENCY MEDICINE

## 2022-03-20 PROCEDURE — 71045 X-RAY EXAM CHEST 1 VIEW: CPT | Performed by: EMERGENCY MEDICINE

## 2022-03-20 PROCEDURE — 71275 CT ANGIOGRAPHY CHEST: CPT | Performed by: EMERGENCY MEDICINE

## 2022-03-20 PROCEDURE — 96374 THER/PROPH/DIAG INJ IV PUSH: CPT

## 2022-03-20 RX ORDER — NAPROXEN 500 MG/1
500 TABLET ORAL 2 TIMES DAILY PRN
Qty: 20 TABLET | Refills: 0 | Status: SHIPPED | OUTPATIENT
Start: 2022-03-20

## 2022-03-20 RX ORDER — LORAZEPAM 2 MG/ML
1 INJECTION INTRAMUSCULAR ONCE
Status: COMPLETED | OUTPATIENT
Start: 2022-03-20 | End: 2022-03-20

## 2022-03-20 RX ORDER — TRAMADOL HYDROCHLORIDE 50 MG/1
TABLET ORAL EVERY 6 HOURS PRN
Qty: 10 TABLET | Refills: 0 | Status: SHIPPED | OUTPATIENT
Start: 2022-03-20 | End: 2022-03-25

## 2022-03-20 RX ORDER — IOHEXOL 350 MG/ML
100 INJECTION, SOLUTION INTRAVENOUS
Status: COMPLETED | OUTPATIENT
Start: 2022-03-20 | End: 2022-03-20

## 2022-03-20 RX ORDER — PREDNISONE 20 MG/1
20 TABLET ORAL 2 TIMES DAILY
Qty: 10 TABLET | Refills: 0 | Status: SHIPPED | OUTPATIENT
Start: 2022-03-20 | End: 2022-03-25

## 2022-03-20 RX ORDER — POTASSIUM CHLORIDE 20 MEQ/1
40 TABLET, EXTENDED RELEASE ORAL ONCE
Status: COMPLETED | OUTPATIENT
Start: 2022-03-20 | End: 2022-03-20

## 2022-03-20 NOTE — ED INITIAL ASSESSMENT (HPI)
PT states that he woke up with chest pain today (03/20/22). PT states that his pain is in the \"middle of his chest\", level 7/10, no radiating.  PT states that the pain \"gets worse when taking a deep breath\"

## 2022-06-17 ENCOUNTER — APPOINTMENT (OUTPATIENT)
Dept: ULTRASOUND IMAGING | Age: 62
End: 2022-06-17
Attending: EMERGENCY MEDICINE
Payer: COMMERCIAL

## 2022-06-17 ENCOUNTER — APPOINTMENT (OUTPATIENT)
Dept: GENERAL RADIOLOGY | Age: 62
End: 2022-06-17
Attending: EMERGENCY MEDICINE
Payer: COMMERCIAL

## 2022-06-17 ENCOUNTER — HOSPITAL ENCOUNTER (EMERGENCY)
Age: 62
Discharge: HOME | End: 2022-06-17
Attending: EMERGENCY MEDICINE
Payer: COMMERCIAL

## 2022-06-17 VITALS
HEART RATE: 74 BPM | RESPIRATION RATE: 16 BRPM | OXYGEN SATURATION: 99 % | WEIGHT: 240 LBS | SYSTOLIC BLOOD PRESSURE: 147 MMHG | BODY MASS INDEX: 34.36 KG/M2 | HEIGHT: 70 IN | TEMPERATURE: 98 F | DIASTOLIC BLOOD PRESSURE: 87 MMHG

## 2022-06-17 DIAGNOSIS — M79.89 LEFT LEG SWELLING: Primary | ICD-10-CM

## 2022-06-17 DIAGNOSIS — M79.672 FOOT PAIN, LEFT: ICD-10-CM

## 2022-06-17 PROCEDURE — 99284 EMERGENCY DEPT VISIT MOD MDM: CPT

## 2022-06-17 PROCEDURE — 73610 X-RAY EXAM OF ANKLE: CPT | Performed by: EMERGENCY MEDICINE

## 2022-06-17 PROCEDURE — 93971 EXTREMITY STUDY: CPT | Performed by: EMERGENCY MEDICINE

## 2022-06-17 PROCEDURE — 73630 X-RAY EXAM OF FOOT: CPT | Performed by: EMERGENCY MEDICINE

## 2022-06-27 ENCOUNTER — OFFICE VISIT (OUTPATIENT)
Dept: PODIATRY CLINIC | Facility: CLINIC | Age: 62
End: 2022-06-27
Payer: COMMERCIAL

## 2022-06-27 VITALS — SYSTOLIC BLOOD PRESSURE: 132 MMHG | DIASTOLIC BLOOD PRESSURE: 74 MMHG

## 2022-06-27 DIAGNOSIS — M72.2 PLANTAR FASCIITIS OF LEFT FOOT: Primary | ICD-10-CM

## 2022-06-27 DIAGNOSIS — M79.672 INFLAMMATORY HEEL PAIN, LEFT: ICD-10-CM

## 2022-06-27 RX ORDER — TRIAMCINOLONE ACETONIDE 40 MG/ML
20 INJECTION, SUSPENSION INTRA-ARTICULAR; INTRAMUSCULAR ONCE
Status: DISCONTINUED | OUTPATIENT
Start: 2022-06-27 | End: 2022-06-27

## 2022-06-27 RX ORDER — TRIAMCINOLONE ACETONIDE 40 MG/ML
40 INJECTION, SUSPENSION INTRA-ARTICULAR; INTRAMUSCULAR ONCE
Status: SHIPPED | OUTPATIENT
Start: 2022-06-27

## 2022-08-01 ENCOUNTER — OFFICE VISIT (OUTPATIENT)
Facility: LOCATION | Age: 62
End: 2022-08-01
Payer: COMMERCIAL

## 2022-08-01 DIAGNOSIS — K04.6 PERIAPICAL ABSCESS OF TOOTH WITH FISTULA: Primary | ICD-10-CM

## 2022-08-01 PROCEDURE — 99203 OFFICE O/P NEW LOW 30 MIN: CPT | Performed by: OTOLARYNGOLOGY

## 2022-08-01 RX ORDER — AMOXICILLIN AND CLAVULANATE POTASSIUM 875; 125 MG/1; MG/1
1 TABLET, FILM COATED ORAL EVERY 12 HOURS
Qty: 20 TABLET | Refills: 0 | Status: SHIPPED | OUTPATIENT
Start: 2022-08-01

## 2022-08-02 ENCOUNTER — OFFICE VISIT (OUTPATIENT)
Dept: PODIATRY CLINIC | Facility: CLINIC | Age: 62
End: 2022-08-02
Payer: COMMERCIAL

## 2022-08-02 DIAGNOSIS — M79.672 INFLAMMATORY HEEL PAIN, LEFT: ICD-10-CM

## 2022-08-02 DIAGNOSIS — M72.2 PLANTAR FASCIITIS OF LEFT FOOT: Primary | ICD-10-CM

## 2022-08-02 PROCEDURE — 99213 OFFICE O/P EST LOW 20 MIN: CPT | Performed by: PODIATRIST

## 2023-03-01 PROBLEM — Z86.010 PERSONAL HISTORY OF COLONIC POLYPS: Status: ACTIVE | Noted: 2023-03-01

## 2023-03-01 PROBLEM — D12.3 BENIGN NEOPLASM OF TRANSVERSE COLON: Status: ACTIVE | Noted: 2023-03-01

## 2023-03-01 PROBLEM — D12.5 BENIGN NEOPLASM OF SIGMOID COLON: Status: ACTIVE | Noted: 2023-03-01

## 2023-03-01 PROBLEM — Z86.0100 PERSONAL HISTORY OF COLONIC POLYPS: Status: ACTIVE | Noted: 2023-03-01

## 2024-09-13 ENCOUNTER — HOSPITAL ENCOUNTER (OUTPATIENT)
Dept: LAB | Facility: HOSPITAL | Age: 64
Discharge: HOME | End: 2024-09-13
Attending: INTERNAL MEDICINE
Payer: COMMERCIAL

## 2024-09-13 LAB
TESTOST SERPL-MCNC: 382.72 NG/DL
VIT D+METAB SERPL-MCNC: 22.8 NG/ML (ref 30–100)

## 2024-09-13 PROCEDURE — 36415 COLL VENOUS BLD VENIPUNCTURE: CPT | Performed by: INTERNAL MEDICINE

## 2024-09-13 PROCEDURE — 84403 ASSAY OF TOTAL TESTOSTERONE: CPT | Performed by: INTERNAL MEDICINE

## 2024-09-13 PROCEDURE — 82306 VITAMIN D 25 HYDROXY: CPT | Performed by: INTERNAL MEDICINE

## 2024-10-20 ENCOUNTER — IMMUNIZATION (OUTPATIENT)
Dept: FAMILY MEDICINE CLINIC | Facility: CLINIC | Age: 64
End: 2024-10-20
Payer: COMMERCIAL

## 2024-10-20 DIAGNOSIS — Z23 FLU VACCINE NEED: Primary | ICD-10-CM

## 2024-10-20 PROCEDURE — 90471 IMMUNIZATION ADMIN: CPT | Performed by: NURSE PRACTITIONER

## 2024-10-20 PROCEDURE — 90662 IIV NO PRSV INCREASED AG IM: CPT | Performed by: NURSE PRACTITIONER

## 2025-05-20 ENCOUNTER — APPOINTMENT (OUTPATIENT)
Dept: CT IMAGING | Facility: HOSPITAL | Age: 65
End: 2025-05-20
Attending: EMERGENCY MEDICINE
Payer: COMMERCIAL

## 2025-05-20 ENCOUNTER — HOSPITAL ENCOUNTER (EMERGENCY)
Facility: HOSPITAL | Age: 65
Discharge: HOME | End: 2025-05-20
Attending: EMERGENCY MEDICINE
Payer: COMMERCIAL

## 2025-05-20 ENCOUNTER — APPOINTMENT (OUTPATIENT)
Dept: GENERAL RADIOLOGY | Facility: HOSPITAL | Age: 65
End: 2025-05-20
Attending: EMERGENCY MEDICINE
Payer: COMMERCIAL

## 2025-05-20 ENCOUNTER — APPOINTMENT (OUTPATIENT)
Dept: GENERAL RADIOLOGY | Facility: HOSPITAL | Age: 65
End: 2025-05-20
Payer: COMMERCIAL

## 2025-05-20 VITALS
BODY MASS INDEX: 33.64 KG/M2 | WEIGHT: 235 LBS | SYSTOLIC BLOOD PRESSURE: 151 MMHG | HEART RATE: 52 BPM | OXYGEN SATURATION: 100 % | RESPIRATION RATE: 14 BRPM | DIASTOLIC BLOOD PRESSURE: 94 MMHG | TEMPERATURE: 97 F | HEIGHT: 70 IN

## 2025-05-20 DIAGNOSIS — R07.89 CHEST PAIN, ATYPICAL: Primary | ICD-10-CM

## 2025-05-20 DIAGNOSIS — S80.02XA CONTUSION OF LEFT KNEE, INITIAL ENCOUNTER: ICD-10-CM

## 2025-05-20 LAB
ALBUMIN SERPL-MCNC: 4.8 G/DL (ref 3.2–4.8)
ALBUMIN/GLOB SERPL: 1.7 {RATIO} (ref 1–2)
ALP LIVER SERPL-CCNC: 83 U/L (ref 45–117)
ALT SERPL-CCNC: 31 U/L (ref 10–49)
ANION GAP SERPL CALC-SCNC: 10 MMOL/L (ref 0–18)
APTT PPP: 32.8 SECONDS (ref 23–36)
AST SERPL-CCNC: 31 U/L (ref ?–34)
BASOPHILS # BLD AUTO: 0.04 X10(3) UL (ref 0–0.2)
BASOPHILS NFR BLD AUTO: 0.7 %
BILIRUB SERPL-MCNC: 0.6 MG/DL (ref 0.2–1.1)
BUN BLD-MCNC: 15 MG/DL (ref 9–23)
CALCIUM BLD-MCNC: 9.6 MG/DL (ref 8.7–10.6)
CHLORIDE SERPL-SCNC: 106 MMOL/L (ref 98–112)
CO2 SERPL-SCNC: 28 MMOL/L (ref 21–32)
CREAT BLD-MCNC: 0.83 MG/DL (ref 0.7–1.3)
D DIMER PPP FEU-MCNC: <0.27 UG/ML FEU (ref ?–0.65)
EGFRCR SERPLBLD CKD-EPI 2021: 97 ML/MIN/1.73M2 (ref 60–?)
EOSINOPHIL # BLD AUTO: 0.17 X10(3) UL (ref 0–0.7)
EOSINOPHIL NFR BLD AUTO: 2.8 %
ERYTHROCYTE [DISTWIDTH] IN BLOOD BY AUTOMATED COUNT: 12.9 %
GLOBULIN PLAS-MCNC: 2.8 G/DL (ref 2–3.5)
GLUCOSE BLD-MCNC: 95 MG/DL (ref 70–99)
HCT VFR BLD AUTO: 45 % (ref 39–53)
HGB BLD-MCNC: 15.7 G/DL (ref 13–17.5)
IMM GRANULOCYTES # BLD AUTO: 0.02 X10(3) UL (ref 0–1)
IMM GRANULOCYTES NFR BLD: 0.3 %
INR BLD: 1.13 (ref 0.8–1.2)
LYMPHOCYTES # BLD AUTO: 1.84 X10(3) UL (ref 1–4)
LYMPHOCYTES NFR BLD AUTO: 30.2 %
MCH RBC QN AUTO: 29.6 PG (ref 26–34)
MCHC RBC AUTO-ENTMCNC: 34.9 G/DL (ref 31–37)
MCV RBC AUTO: 84.9 FL (ref 80–100)
MONOCYTES # BLD AUTO: 0.45 X10(3) UL (ref 0.1–1)
MONOCYTES NFR BLD AUTO: 7.4 %
NEUTROPHILS # BLD AUTO: 3.57 X10 (3) UL (ref 1.5–7.7)
NEUTROPHILS # BLD AUTO: 3.57 X10(3) UL (ref 1.5–7.7)
NEUTROPHILS NFR BLD AUTO: 58.6 %
OSMOLALITY SERPL CALC.SUM OF ELEC: 299 MOSM/KG (ref 275–295)
PLATELET # BLD AUTO: 154 10(3)UL (ref 150–450)
POTASSIUM SERPL-SCNC: 3.2 MMOL/L (ref 3.5–5.1)
PROT SERPL-MCNC: 7.6 G/DL (ref 5.7–8.2)
PROTHROMBIN TIME: 14.6 SECONDS (ref 11.6–14.8)
RBC # BLD AUTO: 5.3 X10(6)UL (ref 3.8–5.8)
SODIUM SERPL-SCNC: 144 MMOL/L (ref 136–145)
TROPONIN I SERPL HS-MCNC: 16 NG/L (ref ?–53)
WBC # BLD AUTO: 6.1 X10(3) UL (ref 4–11)

## 2025-05-20 PROCEDURE — 99285 EMERGENCY DEPT VISIT HI MDM: CPT

## 2025-05-20 PROCEDURE — 85025 COMPLETE CBC W/AUTO DIFF WBC: CPT

## 2025-05-20 PROCEDURE — 73562 X-RAY EXAM OF KNEE 3: CPT | Performed by: EMERGENCY MEDICINE

## 2025-05-20 PROCEDURE — 84484 ASSAY OF TROPONIN QUANT: CPT

## 2025-05-20 PROCEDURE — 80053 COMPREHEN METABOLIC PANEL: CPT | Performed by: EMERGENCY MEDICINE

## 2025-05-20 PROCEDURE — 74175 CTA ABDOMEN W/CONTRAST: CPT | Performed by: EMERGENCY MEDICINE

## 2025-05-20 PROCEDURE — 36415 COLL VENOUS BLD VENIPUNCTURE: CPT

## 2025-05-20 PROCEDURE — 71275 CT ANGIOGRAPHY CHEST: CPT | Performed by: EMERGENCY MEDICINE

## 2025-05-20 PROCEDURE — 85730 THROMBOPLASTIN TIME PARTIAL: CPT | Performed by: EMERGENCY MEDICINE

## 2025-05-20 PROCEDURE — 93010 ELECTROCARDIOGRAM REPORT: CPT

## 2025-05-20 PROCEDURE — 93005 ELECTROCARDIOGRAM TRACING: CPT

## 2025-05-20 PROCEDURE — 71045 X-RAY EXAM CHEST 1 VIEW: CPT

## 2025-05-20 PROCEDURE — 85379 FIBRIN DEGRADATION QUANT: CPT | Performed by: EMERGENCY MEDICINE

## 2025-05-20 PROCEDURE — 85610 PROTHROMBIN TIME: CPT | Performed by: EMERGENCY MEDICINE

## 2025-05-20 PROCEDURE — 80053 COMPREHEN METABOLIC PANEL: CPT

## 2025-05-20 PROCEDURE — 84484 ASSAY OF TROPONIN QUANT: CPT | Performed by: EMERGENCY MEDICINE

## 2025-05-20 PROCEDURE — 85025 COMPLETE CBC W/AUTO DIFF WBC: CPT | Performed by: EMERGENCY MEDICINE

## 2025-05-20 RX ORDER — ASPIRIN 81 MG/1
324 TABLET, CHEWABLE ORAL ONCE
Status: COMPLETED | OUTPATIENT
Start: 2025-05-20 | End: 2025-05-20

## 2025-05-20 RX ORDER — POTASSIUM CHLORIDE 1500 MG/1
40 TABLET, EXTENDED RELEASE ORAL ONCE
Status: COMPLETED | OUTPATIENT
Start: 2025-05-20 | End: 2025-05-20

## 2025-05-20 NOTE — ED INITIAL ASSESSMENT (HPI)
Pt states having left rib pain radiating around to right rib.  Pt notes having high BP's at home. 190's systolic.

## 2025-05-20 NOTE — HISTORICAL OFFICE NOTE
[facility]  [address] [place] [address], 4th [place] [address]  [phone]      Rahul Farah  Progress Note  Demographics:  Name: Rahul Farah YOB: 1960  Age: 64, Male Medical Record No: [id number]  Visited Date/Time: 09/13/2024 10:10 AM    Chief Complaints  annual  echo done  History of Present Illness  Patient with bicuspid aortic valve and moderate insufficiency.  Patient had lost weight with exercise.  He has a dilated heart rate.  Recent echo showed moderate consistent with aorta 4.0 cm  Cardiac risk factors Hypertension, Dyslipidemia and Never smoked  Past Medical History  1.Fatigue  2.Bicuspid aortic valve  3.Essential hypertension  4.Nonrheumatic aortic (valve) insufficiency  5.Palpitations  6.Anxiety  7.Dilated aortic root  Past Surgical History  1.S/P rotator cuff repair  Family History  1. Mother Age 78 - Aphasic stroke (Reason for death)  Social History  Smoking status Never smoked  Tobacco usage - No (Non-smoker (finding))  Review of systems  Cardiovascular No history of Chest pain, MCDANIEL, Palpitations, Syncope, PND, Orthopnea, Edema and Claudication  Respiratory No history of SOB  Physical Examination  Vitals Right Arm Sitting  / 86 mmHg, Pulse rate 69 bpm, Regular, Height in 5' 10\", BMI: 33.4, Weight in 233 lbs (or) 105.69 kgs and BSA : 2.32 cc/m²  General Appearance No Acute Distress and Normal Body habitus  Head/Eyes/Ears/Nose/Mouth/Throat Conjunctiva pink and Mucous membranes Moist  Neck Normal carotid pulsations, No carotid bruits and No JVD  Respiratory Unlabored and Lungs clear with normal breath sounds  Cardiovascular Intact distal pulses and Regular rhythm. Normal rate present. Normal and normal S1 and S2  High-pitched blowing decrescendo early murmur is present with a grade of 2 at the upper right sternal border radiating to the apex.    Gastrointestinal Abdomen soft and Non-tender  Musculoskeletal Normal spine  Lower Extremities Pulses 2+ and  equal bilaterally and No edema  Skin Warm and dry and No rashes or lesions  Neurologic / Psychiatric Alert and Oriented  Speech Normal speech  Allergies  No known medication allergies.  Medications  1.Allegra Allergy 60 mg tablet, Take 1 tablet orally once a day.  2.amLODIPine 5 mg tablet, Take 1 tablet orally once a day.  3.multivitamin tablet, Take 1 tablet orally once a day.  4.PARoxetine (PAXIL) 10 MG tablet, daily.  5.pravastatin (PRAVACHOL) 40 MG tablet, 1 tablet daily at bedtime  Impression  1.Bicuspid aortic valve  2.Essential hypertension  3.Nonrheumatic aortic (valve) insufficiency  4.Dilated aortic root  Assessment & Plan  This patient with bicuspid aortic valve and moderate insufficiency with good LV function.  His aorta is dilated 4 cm.  Present time patient have a 2D echo in .  See me in .    Patient been having some fatigue    Check a serum testosterone vitamin D level.    Plan  1 serum testosterone vitamin D level  2.  2D echo in   3.  See me in  Labs and Diagnostics ordered  1.Testosterone, total (Schedule next available)  2.Vitamin D (Total) (Schedule next available)  3.Echocardiography - Complete ([facility]) 2025 12:00:00 AM  Future appointments  1.Follow up visit - Robert Gibbons MD 2025 12:00:00 AM  Nurses documentation  Upcoming surgeries/procedures: None  Use of assistive devices: CPAP  Verbal order for EKG: No  Refills: None  Triage and medication list reviewed by: BRAD MONET   Patient instructions  per verbal MD order:      Testin. Complete an echocardiogram in 2025   2. Complete blood work: testosterone and vitamin D    Provider Follow Up:  1. Follow up with Dr. Gibbons in 2025     Please bring in you medication bottles or updated medicine list to your next appointment.  Call [facility] if you have any problems or concerns at [phone]   Lab Details  TESTOSTERONE TOTAL  2024 11:41:38 AM  TESTOSTERONE 382.72 187..19 ng/dL ng/dL  F  VITAMIN D,  25-HYDROXY  09/13/2024 11:41:38 AM  25-HYDROXYVITAMIN D (TOTAL) 22.8 30.0-100.0 ng/mL L F  Diagnostics Details  Trans Thoracic Echocardiogram 08/27/2024  1.The study quality is average.    2.The left ventricle is normal in size. Asymmetric septal left ventricular hypertrophy is present. Left ventricular diastolic function is normal. Global left ventricular systolic function is normal. The left ventricular ejection fraction is 55%. No regional wall motion abnormalities. Possible early volume overload.    3.The left atrium is mildly enlarged based on the left atrium volume index of 41.5ml/m².    4.The right ventricle is normal in size. Right ventricular systolic function is normal.    5.Dilatation of the aortic root limited to the sinus of valsalva is noted. Aortic root diameter is 4.0 cms.    6.The aortic valve appears possibly tricuspid. Mild to moderate (1-2+) aortic regurgitation. closer to moderate    7.Pulmonary artery pressure not obtained due to insignificant TR jet.    Exercise Myocardial Perfusion Imaging 06/06/2022  1.Stress EKG is normal.    1.This is a normal perfusion study, no perfusion defects noted.    2.The left ventricular cavity is noted to be mildly enlarged on the stress study. The left ventricular ejection fraction was calculated to be 41% and left ventricular global function is mildly reduced.    CPOE Orders carried out by: Alia TAYLOR  Care Providers: Robert Gibbons MD, Alia TAYLOR and Juana Roberto  Electronically Authenticated by  Robert Gibbons MD  09/15/2024 12:10:17 PM  Disclaimer: Components of this note were documented using voice recognition system and are subject to errors not corrected at proofreading. Contact the author of this note for any clarifications.

## 2025-05-20 NOTE — ED PROVIDER NOTES
Patient Seen in: [facility] Emergency Department        History  Chief Complaint   Patient presents with    Chest Pain     Stated Complaint: chest/rib pain    Subjective:   HPI            Patient is a 64-year-old male presents emergency room with a history of left-sided rib pain radiating around to his right ribs which has been ongoing over the last several days.  Patient states he did have a fall a couple of days ago in which he landed on his left knee.  Patient denies hitting his head or landing against his chest.  The patient denies history of any back pain.  The patient denies history of any associated shortness of breath, or nausea.  The patient reportedly had high blood pressure earlier this week and was started on a new medication by his primary care physician.  The patient denies history of any known heart blockages in the past but does have a history of a bicuspid aortic valve per patient.            Objective:     Past Medical History:    Abdominal hernia    ANXIETY    Anxiety    Back pain    Esophageal reflux    Fatigue    Flatulence/gas pain/belching    Frequent urination    Hearing loss    Heart palpitations    Heartburn    Hiatal hernia    High cholesterol    History of cardiac murmur    Hyperlipidemia    Leg swelling    Sleep apnea    Unspecified essential hypertension    Wears glasses    Weight gain              Past Surgical History:   Procedure Laterality Date    Colonoscopy  2000    Colonoscopy,diagnostic  12/23/09    Performed by SOHEILA ROJO at [facility], [facility]    Laparoscopic cholecystectomy  9/13/2014    Procedure: LAPAROSCOPIC CHOLECYSTECTOMY;  Surgeon: Apryl Alcocer MD;  Location: [facility] MAIN OR    Rotator cuff repair Left 02/2022    Sinus surgery        Upper gi endoscopy - referral  11/2011    [place] 11/16/11. Nl esophagus, small hiatal hernia, otherwise normal.                Social History     Socioeconomic History    Marital status:    Tobacco Use    Smoking  status: Never    Smokeless tobacco: Never   Vaping Use    Vaping status: Never Used   Substance and Sexual Activity    Alcohol use: Yes     Alcohol/week: 4.0 standard drinks of alcohol     Types: 4 Glasses of wine per week     Comment: SOCIAL    Drug use: No                                Physical Exam    ED Triage Vitals   BP 05/20/25 1322 (!) 181/96   Pulse 05/20/25 1322 67   Resp 05/20/25 1322 16   Temp 05/20/25 1323 97.3 °F (36.3 °C)   Temp src 05/20/25 1323 Temporal   SpO2 05/20/25 1322 97 %   O2 Device 05/20/25 1322 None (Room air)       Current Vitals:   Vital Signs  BP: (!) 164/87  Pulse: 58  Resp: 16  Temp: 97.3 °F (36.3 °C)  Temp src: Temporal  MAP (mmHg): (!) 106    Oxygen Therapy  SpO2: 100 %  O2 Device: None (Room air)            Physical Exam       GENERAL: Well-developed, well-nourished male sitting up breathing easily in no apparent distress.  Patient is nontoxic in appearance.  HEENT: Head is normocephalic, atraumatic. Pupils are 4 mm equally round and reactive to light. Oropharynx is clear. Mucous membranes are moist.  NECK: There is no focal tenderness to palpation appreciated.    LUNGS: Clear to auscultation bilaterally with no wheeze. There is good equal air entry bilaterally.  HEART: Regular rate and rhythm. Normal S1, S2 no S3, or S4. No murmur.  ABDOMEN: There is no focal tenderness to palpation appreciated anywhere throughout the abdomen. There is no guarding, no rebound, no mass, and no organomegaly appreciated. There is normoactive bowel sounds.    EXTREMITIES: There is no cyanosis, clubbing, or edema appreciated. Pulses are 2+ and equal in all 4 extremities.  There is mild tenderness to palpation along the anterior medial aspect of the left knee only with no other focal tenderness throughout the left lower extremity appreciated.  NEURO: Patient is awake, alert and oriented to time place and person. Motor strength is 5 over 5 in all 4 extremities. There are no gross motor or sensory  deficits appreciated. Cranial nerves II through XII are intact.  Patient answering all questions appropriately.          ED Course  Labs Reviewed   COMP METABOLIC PANEL (14) - Abnormal; Notable for the following components:       Result Value    Potassium 3.2 (*)     Calculated Osmolality 299 (*)     All other components within normal limits   TROPONIN I HIGH SENSITIVITY - Normal   PROTHROMBIN TIME (PT) - Normal   PTT, ACTIVATED - Normal   D-DIMER - Normal   CBC WITH DIFFERENTIAL WITH PLATELET   RAINBOW DRAW LAVENDER   RAINBOW DRAW LIGHT GREEN   RAINBOW DRAW BLUE     EKG    Rate, intervals and axes as noted on EKG Report.  Rate: 64  Rhythm: Sinus Rhythm  Reading: No acute ST elevation appreciated.         I personally reviewed the patient's left knee x-ray images my individual interpretation shows no evidence of any acute fracture.  I also reviewed official radiology report which showed results as noted below.     CTA CHEST+CTA ABDOMEN DISSECT SET (CPT=71275/50661)  Result Date: 5/20/2025  CONCLUSION:   1. No evidence of aortic dissection.  2. Mild ectasia of the proximal ascending aorta to 39 mm maximally.  This has increased from 36 mm on 3/20/2022.  3. Minimally increased size of pericardial cyst.    LOCATION:  [facility]    Dictated by (CST): Ruddy Hooker MD on 5/20/2025 at 5:54 PM     Finalized by (CST): Ruddy Hooker MD on 5/20/2025 at 6:06 PM       XR KNEE (3 VIEWS), LEFT (CPT=73562)  Result Date: 5/20/2025  CONCLUSION:  Mild-to-moderate tri compartment joint space narrowing consistent with osteoarthritis.  No acute fracture or dislocation.  Small suprapatellar effusion.   LOCATION:  [id number]   Dictated by (CST): Meron Rodriguez MD on 5/20/2025 at 3:56 PM     Finalized by (CST): Meron Rodriguez MD on 5/20/2025 at 3:57 PM       XR CHEST AP PORTABLE  (CPT=71045)  Result Date: 5/20/2025  CONCLUSION:  No acute pulmonary findings.   LOCATION:  [facility]      Dictated by (CST): Stephie Weems MD on 5/20/2025 at 3:45 PM      Finalized by (CST): Stephie Weems MD on 5/20/2025 at 3:45 PM                         MDM         16:10 patient seen and evaluated by Juju at the bedside he wants a CT angiogram of the chest to be done to assess the patient's aorta.  The patient will be discharged to home as per Dr. Roberto if CT shows no acute aortic dissection.  We will continue to observe at this time.  18:23 patient up and ambulatory in the emergency room with steady gait and no ataxia.  Patient eager to go home at this time.  Patient with no other new complaints whatsoever at this time.  The patient will be discharged to home as per Dr. Roberto who saw him in the emergency room.  Patient will be asked to follow-up with cardiology in the office this week.        Medical Decision Making  Patient an IV line established blood work drawn including CBC, chemistries, BUN/creatinine, and blood sugar showed evidence of some mild hypokalemia for which the patient was given oral potassium in the ER.  Liver function test and troponin found.  D-dimer found to be negative.  Coags are unremarkable.  Patient underwent x-ray findings as noted above additionally.  The patient's case was discussed with Dr. Roberto who came and saw the patient in the emergency room and evaluated the patient at bedside.  He felt the patient should get a CT angiogram of his aorta and if he did not have any evidence of aortic dissection the patient could be discharged to home at this time.  Patient observed for over 5 hours in the emergency room.  Patient sitting back in breathing easily in no apparent distress on initial exam and repeat examinations here in the ER.  The patient's case has been discussed with Dr. Roberto as noted above.  He feels the patient can be discharged home.  Patient will be discharged home as per discussion with cardiologist as noted above and they will call for follow-up in the office this week.  The patient knows to monitor symptoms closely at  home and monitor blood pressure closely at home and return to the ER immediately if symptoms worsen or if any other problems arise.  Patient discharged home as per cardiologist at this time.    Disposition and Plan     Clinical Impression:  1. Chest pain, atypical    2. Contusion of left knee, initial encounter         Disposition:  Discharge  5/20/2025  6:25 pm    Follow-up:  Robert Gibbons MD  [address] [place] [address]20 [phone]    Follow up in 2 day(s)  please call tomorrow for close follow up in the office this week          Medications Prescribed:  Current Discharge Medication List                Supplementary Documentation:

## 2025-05-20 NOTE — CONSULTS
Cardiology Consultation    Rahul Farah Patient Status:  Emergency    1960 MRN [id number]   [facility] EMERGENCY DEPARTMENT Attending Alex Booth MD   Hosp Day # 0 PCP Rahul White MD     Reason for Consultation:  chest pain, HTN      History of Present Illness:  Rahul Farah is a a(n) 64 year old male. Lynn brandon, here with his wife, follows with Dr. Gibbons.  Bicuspid aortic valve with moderate AI, mild Ao root enlargement, HTN.  He saw Dr. White recently concerned about his BP being higher.  A new med was started just a few days ago.  He reports right sided chest pains, sometimes radiating across his chest.  They have been constant for the past two days.  No dyspnea.    History:  Past Medical History[1]  Past Surgical History[2]  Family History[3]      Allergies:  Allergies[4]    Medications:  Scheduled Medications[5]    Continuous Infusions:  Medication Infusions[6]    Social History:   reports that he has never smoked. He has never used smokeless tobacco. He reports current alcohol use of about 4.0 standard drinks of alcohol per week. He reports that he does not use drugs.    Review of Systems:  All systems were reviewed and are negative except as described above in HPI.    Physical Exam:      Temp:  [97.3 °F (36.3 °C)] 97.3 °F (36.3 °C)  Pulse:  [64-67] 64  Resp:  [16-18] 18  BP: (130-181)/(96-97) 130/97  SpO2:  [97 %-100 %] 100 %    Last 3 Weights   25 1322 235 lb (106.6 kg)   23 1453 235 lb (106.6 kg)   22 0109 240 lb (108.9 kg)           General: No apparent distress  HEENT: No focal deficits.  Neck: supple. JVP normal  Cardiac: Regular rhythm, S1, S2 normal,   No rub or gallop.  No murmur.  Lungs: CTA  Abdomen: Soft, non-tender.   Extremities: No edema  Neurologic: no focal deficits  Skin: Warm and dry.          Telemetry: sinus    Laboratories and Data:  Diagnostics:    EKG, 2025:  NSSTTW changes    CXR, 2025:  clear    Labs:   HEM:  Recent Labs    Lab 05/20/25  1327   WBC 6.1   HGB 15.7   .0       Chem:  Recent Labs   Lab 05/20/25  1327      K 3.2*      CO2 28.0   BUN 15   CREATSERUM 0.83   CA 9.6   GLU 95       Recent Labs   Lab 05/20/25  1327   ALT 31   AST 31   ALB 4.8       Recent Labs   Lab 05/20/25  1327   PTP 14.6   INR 1.13       No results for input(s): \"TROP\", \"CK\" in the last 168 hours.      Impression:   Chest pains - atypical for ischemia.  Prolonged pain, normal troponin.  HTN, labile and accelerated.  Bicuspid aortic valve, mod AI, mild Ao root enlargement.    Plan:  Long talk with them.  Certainly BP's are being driven by his concerns.  CTA aorta now.  If ok, stable for dc on current meds.  Only check BP's when he is in his happy place.  See Dr. Gibbons as scheduled.    Jose Roberto MD  5/20/2025  4:16 PM  C5         [1]   Past Medical History:   Abdominal hernia    ANXIETY    Anxiety    Back pain    Esophageal reflux    Fatigue    Flatulence/gas pain/belching    Frequent urination    Hearing loss    Heart palpitations    Heartburn    Hiatal hernia    High cholesterol    History of cardiac murmur    Hyperlipidemia    Leg swelling    Sleep apnea    Unspecified essential hypertension    Wears glasses    Weight gain   [2]   Past Surgical History:  Procedure Laterality Date    Colonoscopy  2000    Colonoscopy,diagnostic  12/23/09    Performed by SOHEILA ROJO at [facility] [place], [facility]    Laparoscopic cholecystectomy  9/13/2014    Procedure: LAPAROSCOPIC CHOLECYSTECTOMY;  Surgeon: Apryl Alcocer MD;  Location:  MAIN OR    Rotator cuff repair Left 02/2022    Sinus surgery        Upper gi endoscopy - referral  11/2011    [facility] 11/16/11. Nl esophagus, small hiatal hernia, otherwise normal.   [3]   Family History  Problem Relation Age of Onset    Colon Polyps Father     Cancer Father         COLON CANCER    Heart Disorder Father         AORTIC ANEURYSM    Colon Cancer Father     Stroke Mother     Hypertension  Mother     Cancer Paternal Grandfather         COLON CANCER    Colon Cancer Paternal Grandfather     Colon Polyps Brother     Ulcerative Colitis Brother     Cancer Brother         colon dxd 40- history of IBD and had surgery where tumor found at time of operation    Colon Cancer Brother     Diabetes Neg    [4] No Known Allergies  [5] [6]

## 2025-05-21 LAB
ATRIAL RATE: 64 BPM
P AXIS: 33 DEGREES
P-R INTERVAL: 156 MS
Q-T INTERVAL: 442 MS
QRS DURATION: 94 MS
QTC CALCULATION (BEZET): 455 MS
R AXIS: -13 DEGREES
T AXIS: 68 DEGREES
VENTRICULAR RATE: 64 BPM

## (undated) NOTE — ED AVS SNAPSHOT
[facility] Emergency Department    [place]  [address] [address]    Phone:  [phone]    Fax:  Ken [phone]   MRN: [id number]    Department:  [facility] Emergency Department   Date of Visit:  2/17/ IF THERE IS ANY CHANGE OR WORSENING OF YOUR CONDITION, CALL YOUR PRIMARY CARE PHYSICIAN AT ONCE OR RETURN IMMEDIATELY TO THE EMERGENCY DEPARTMENT.     If you have been prescribed any medication(s), please fill your prescription right away and begin taking t

## (undated) NOTE — LETTER
AUTHORIZATION FOR SURGICAL OPERATION OR OTHER PROCEDURE    1. I hereby authorize Dr. Marleni Zurita, and [facility] staff assigned to my case to perform the following operation and/or procedure at the [facility]:    _______________________________________________________________________________________________    Cortisone Injection Left heel  _______________________________________________________________________________________________    2. My physician has explained the nature and purpose of the operation or other procedure, possible alternative methods of treatment, the risks involved, and the possibility of complication to me. I acknowledge that no guarantee has been made as to the result that may be obtained. 3.  I recognize that, during the course of this operation, or other procedure, unforseen conditions may necessitate additional or different procedure than those listed above. I, therefore, further authorize and request that the above named physician, his/her physician assistants or designees perform such procedures as are, in his/her professional opinion, necessary and desirable. 4.  Any tissue or organs removed in the operation or other procedure may be disposed of by and at the discretion of the [facility] and [facility] [place]. 5.  I understand that in the event of a medical emergency, I will be transported by local paramedics to [facility] or other hospital emergency department. 6.  I certify that I have read and fully understand the above consent to operation and/or other procedure. 7.  I acknowledge that my physician has explained sedation/analgesia administration to me including the risks and benefits. I consent to the administration of sedation/analgesia as may be necessary or desirable in the judgement of my physician.     Witness signature: ___________________________________________________ Date:  ______/______/_____ Time:  ________ A. M.  P.M. Patient Name:  ______________________________________________________  (please print)      Patient signature:  ___________________________________________________             Relationship to Patient:           []  Parent    Responsible person                          []  Spouse  In case of minor or                    [] Other  _____________   Incompetent name:  __________________________________________________                               (please print)      _____________      Responsible person  In case of minor or  Incompetent signature:  _______________________________________________    Statement of Physician  My signature below affirms that prior to the time of the procedure, I have explained to the patient and/or his/her guardian, the risks and benefits involved in the proposed treatment and any reasonable alternative to the proposed treatment. I have also explained the risks and benefits involved in the refusal of the proposed treatment and have answered the patient's questions.                         Date:  ______/______/_______  Provider                      Signature:  __________________________________________________________       Time:  ___________ A.M    P.M.

## (undated) NOTE — ED AVS SNAPSHOT
Lenore Guillen   MRN: [id number]    Department:  [facility] Emergency Department   Date of Visit:  1/1/2019           Disclosure     Insurance plans vary and the physician(s) referred by the ER may not be covered by your plan.  Please contact your tell this physician (or your personal doctor if your instructions are to return to your personal doctor) about any new or lasting problems. The primary care or specialist physician will see patients referred from the [facility] Emergency Department.  Bunny Painter

## (undated) NOTE — ED AVS SNAPSHOT
[facility] Emergency Department    [place]  [address] [place] [address]    Phone:  [phone]    Fax:  Ken [phone]   MRN: [id number]    Department:  [facility] Emergency Department   Date of Visit:  2/17/ Si usted tiene algun problema con gill sequimiento, por favor llame a nuestro adminstrador de elen al [phone]    Expect to receive an electronic request (by e-mail or text) to complete a self-assessment the day after your visit.   You may also receiv [facility] [address] ([address]) [address] [place]  ([address]) [address] [facility] ([address]) [address] [facility] [address] (8 Dictated by: Desire Chowdhury MD on 2/17/2017 at 15:36       Approved by: Desire Chowdhury MD              Narrative:    PROCEDURE:  CT ANGIOGRAM OF THE CHEST     COMPARISON:  None.      INDICATIONS:  fever, JUAN PABLO     TECHNIQUE:  IV contrast-enhanced multislice COMPARISON:  [facility] , CHEST W/O CONTRAST, 9/29/2011, 16:24. [facility] , XR CHEST PA + LAT CHEST (CPT=71020), 10/13/2016, 0:56. TECHNIQUE:  PA and lateral chest radiographs were obtained.      PATIENT STATED HISTORY:  Patient has had a dry cough for 3 jeferson

## (undated) NOTE — Clinical Note
I had the pleasure of seeing Mr. Trinh Coleman in my office today. Please see my attached note.     Lizbet Calvin